# Patient Record
Sex: FEMALE | Race: BLACK OR AFRICAN AMERICAN | NOT HISPANIC OR LATINO | URBAN - METROPOLITAN AREA
[De-identification: names, ages, dates, MRNs, and addresses within clinical notes are randomized per-mention and may not be internally consistent; named-entity substitution may affect disease eponyms.]

---

## 2016-03-05 RX ORDER — CHOLECALCIFEROL (VITAMIN D3) 125 MCG
4000 CAPSULE ORAL
Qty: 30 | Refills: 0 | COMMUNITY
Start: 2016-03-05 | End: 2016-04-04

## 2017-03-03 ENCOUNTER — INPATIENT (INPATIENT)
Facility: HOSPITAL | Age: 42
LOS: 2 days | Discharge: HOME | DRG: 60 | End: 2017-03-06
Attending: INTERNAL MEDICINE | Admitting: INTERNAL MEDICINE
Payer: MEDICAID

## 2017-03-03 VITALS
TEMPERATURE: 99 F | HEART RATE: 103 BPM | RESPIRATION RATE: 20 BRPM | DIASTOLIC BLOOD PRESSURE: 88 MMHG | OXYGEN SATURATION: 98 % | SYSTOLIC BLOOD PRESSURE: 146 MMHG

## 2017-03-03 DIAGNOSIS — N63 UNSPECIFIED LUMP IN BREAST: Chronic | ICD-10-CM

## 2017-03-03 DIAGNOSIS — Z98.89 OTHER SPECIFIED POSTPROCEDURAL STATES: Chronic | ICD-10-CM

## 2017-03-03 DIAGNOSIS — Z41.8 ENCOUNTER FOR OTHER PROCEDURES FOR PURPOSES OTHER THAN REMEDYING HEALTH STATE: ICD-10-CM

## 2017-03-03 DIAGNOSIS — R53.1 WEAKNESS: ICD-10-CM

## 2017-03-03 DIAGNOSIS — Z90.710 ACQUIRED ABSENCE OF BOTH CERVIX AND UTERUS: Chronic | ICD-10-CM

## 2017-03-03 DIAGNOSIS — G35 MULTIPLE SCLEROSIS: ICD-10-CM

## 2017-03-03 DIAGNOSIS — O34.21 MATERNAL CARE FOR SCAR FROM PREVIOUS CESAREAN DELIVERY: Chronic | ICD-10-CM

## 2017-03-03 DIAGNOSIS — E55.9 VITAMIN D DEFICIENCY, UNSPECIFIED: ICD-10-CM

## 2017-03-03 DIAGNOSIS — O34.529: Chronic | ICD-10-CM

## 2017-03-03 LAB
ALBUMIN SERPL ELPH-MCNC: 5.1 G/DL — HIGH (ref 3.3–5)
ALP SERPL-CCNC: 55 U/L — SIGNIFICANT CHANGE UP (ref 40–120)
ALT FLD-CCNC: 12 U/L RC — SIGNIFICANT CHANGE UP (ref 10–45)
ANION GAP SERPL CALC-SCNC: 14 MMOL/L — SIGNIFICANT CHANGE UP (ref 5–17)
APPEARANCE UR: CLEAR — SIGNIFICANT CHANGE UP
AST SERPL-CCNC: 18 U/L — SIGNIFICANT CHANGE UP (ref 10–40)
BASOPHILS # BLD AUTO: 0.1 K/UL — SIGNIFICANT CHANGE UP (ref 0–0.2)
BASOPHILS NFR BLD AUTO: 1 % — SIGNIFICANT CHANGE UP (ref 0–2)
BILIRUB SERPL-MCNC: 1.5 MG/DL — HIGH (ref 0.2–1.2)
BILIRUB UR-MCNC: NEGATIVE — SIGNIFICANT CHANGE UP
BUN SERPL-MCNC: 17 MG/DL — SIGNIFICANT CHANGE UP (ref 7–23)
CALCIUM SERPL-MCNC: 10 MG/DL — SIGNIFICANT CHANGE UP (ref 8.4–10.5)
CHLORIDE SERPL-SCNC: 103 MMOL/L — SIGNIFICANT CHANGE UP (ref 96–108)
CO2 SERPL-SCNC: 25 MMOL/L — SIGNIFICANT CHANGE UP (ref 22–31)
COLOR SPEC: YELLOW — SIGNIFICANT CHANGE UP
CREAT SERPL-MCNC: 0.91 MG/DL — SIGNIFICANT CHANGE UP (ref 0.5–1.3)
DIFF PNL FLD: NEGATIVE — SIGNIFICANT CHANGE UP
EOSINOPHIL # BLD AUTO: 0.1 K/UL — SIGNIFICANT CHANGE UP (ref 0–0.5)
EOSINOPHIL NFR BLD AUTO: 1.2 % — SIGNIFICANT CHANGE UP (ref 0–6)
EPI CELLS # UR: SIGNIFICANT CHANGE UP /HPF
GLUCOSE SERPL-MCNC: 85 MG/DL — SIGNIFICANT CHANGE UP (ref 70–99)
GLUCOSE UR QL: NEGATIVE — SIGNIFICANT CHANGE UP
HCT VFR BLD CALC: 39.5 % — SIGNIFICANT CHANGE UP (ref 34.5–45)
HGB BLD-MCNC: 13.4 G/DL — SIGNIFICANT CHANGE UP (ref 11.5–15.5)
KETONES UR-MCNC: ABNORMAL
LEUKOCYTE ESTERASE UR-ACNC: NEGATIVE — SIGNIFICANT CHANGE UP
LYMPHOCYTES # BLD AUTO: 2.7 K/UL — SIGNIFICANT CHANGE UP (ref 1–3.3)
LYMPHOCYTES # BLD AUTO: 36.9 % — SIGNIFICANT CHANGE UP (ref 13–44)
MCHC RBC-ENTMCNC: 30.1 PG — SIGNIFICANT CHANGE UP (ref 27–34)
MCHC RBC-ENTMCNC: 33.8 GM/DL — SIGNIFICANT CHANGE UP (ref 32–36)
MCV RBC AUTO: 89.2 FL — SIGNIFICANT CHANGE UP (ref 80–100)
MONOCYTES # BLD AUTO: 0.4 K/UL — SIGNIFICANT CHANGE UP (ref 0–0.9)
MONOCYTES NFR BLD AUTO: 4.9 % — SIGNIFICANT CHANGE UP (ref 2–14)
NEUTROPHILS # BLD AUTO: 4.1 K/UL — SIGNIFICANT CHANGE UP (ref 1.8–7.4)
NEUTROPHILS NFR BLD AUTO: 55.9 % — SIGNIFICANT CHANGE UP (ref 43–77)
NITRITE UR-MCNC: NEGATIVE — SIGNIFICANT CHANGE UP
PH UR: 6 — SIGNIFICANT CHANGE UP (ref 4.8–8)
PLATELET # BLD AUTO: 263 K/UL — SIGNIFICANT CHANGE UP (ref 150–400)
POTASSIUM SERPL-MCNC: 3.9 MMOL/L — SIGNIFICANT CHANGE UP (ref 3.5–5.3)
POTASSIUM SERPL-SCNC: 3.9 MMOL/L — SIGNIFICANT CHANGE UP (ref 3.5–5.3)
PROT SERPL-MCNC: 8 G/DL — SIGNIFICANT CHANGE UP (ref 6–8.3)
PROT UR-MCNC: 30 MG/DL
RBC # BLD: 4.43 M/UL — SIGNIFICANT CHANGE UP (ref 3.8–5.2)
RBC # FLD: 12.3 % — SIGNIFICANT CHANGE UP (ref 10.3–14.5)
RBC CASTS # UR COMP ASSIST: SIGNIFICANT CHANGE UP /HPF (ref 0–2)
SODIUM SERPL-SCNC: 142 MMOL/L — SIGNIFICANT CHANGE UP (ref 135–145)
SP GR SPEC: >1.03 — HIGH (ref 1.01–1.02)
UROBILINOGEN FLD QL: NEGATIVE — SIGNIFICANT CHANGE UP
WBC # BLD: 7.3 K/UL — SIGNIFICANT CHANGE UP (ref 3.8–10.5)
WBC # FLD AUTO: 7.3 K/UL — SIGNIFICANT CHANGE UP (ref 3.8–10.5)
WBC UR QL: SIGNIFICANT CHANGE UP /HPF (ref 0–5)

## 2017-03-03 PROCEDURE — 99223 1ST HOSP IP/OBS HIGH 75: CPT

## 2017-03-03 PROCEDURE — 99285 EMERGENCY DEPT VISIT HI MDM: CPT

## 2017-03-03 RX ORDER — ENOXAPARIN SODIUM 100 MG/ML
40 INJECTION SUBCUTANEOUS EVERY 24 HOURS
Qty: 0 | Refills: 0 | Status: DISCONTINUED | OUTPATIENT
Start: 2017-03-03 | End: 2017-03-06

## 2017-03-03 RX ORDER — IBUPROFEN 200 MG
800 TABLET ORAL EVERY 8 HOURS
Qty: 0 | Refills: 0 | Status: DISCONTINUED | OUTPATIENT
Start: 2017-03-03 | End: 2017-03-06

## 2017-03-03 RX ORDER — CHOLECALCIFEROL (VITAMIN D3) 125 MCG
4000 CAPSULE ORAL DAILY
Qty: 0 | Refills: 0 | Status: DISCONTINUED | OUTPATIENT
Start: 2017-03-03 | End: 2017-03-06

## 2017-03-03 RX ORDER — ACETAMINOPHEN 500 MG
1000 TABLET ORAL ONCE
Qty: 0 | Refills: 0 | Status: COMPLETED | OUTPATIENT
Start: 2017-03-03 | End: 2017-03-03

## 2017-03-03 RX ORDER — IBUPROFEN 200 MG
400 TABLET ORAL EVERY 6 HOURS
Qty: 0 | Refills: 0 | Status: DISCONTINUED | OUTPATIENT
Start: 2017-03-03 | End: 2017-03-06

## 2017-03-03 RX ORDER — SODIUM CHLORIDE 9 MG/ML
1000 INJECTION INTRAMUSCULAR; INTRAVENOUS; SUBCUTANEOUS ONCE
Qty: 0 | Refills: 0 | Status: COMPLETED | OUTPATIENT
Start: 2017-03-03 | End: 2017-03-03

## 2017-03-03 RX ADMIN — Medication 4000 UNIT(S): at 17:24

## 2017-03-03 RX ADMIN — Medication 400 MILLIGRAM(S): at 14:23

## 2017-03-03 RX ADMIN — Medication 800 MILLIGRAM(S): at 22:30

## 2017-03-03 RX ADMIN — Medication 800 MILLIGRAM(S): at 21:00

## 2017-03-03 RX ADMIN — SODIUM CHLORIDE 1000 MILLILITER(S): 9 INJECTION INTRAMUSCULAR; INTRAVENOUS; SUBCUTANEOUS at 14:23

## 2017-03-03 NOTE — ED ADULT NURSE NOTE - OBJECTIVE STATEMENT
pt with hx of MS, has had bilateral lower extremity weakness since friday, c/o increasing weakness from front of legs to thighs, worsening, never falling but having to catch herself from falling. pt aao x 3, no resp distress, skin warm, dry. denies recent illness, no chest pain, no cough, afebrile, no n/v/d, no urinary symptoms, states having normal bm's. puentes x 4 with equal strength, ambulatory without difficulty

## 2017-03-03 NOTE — ED PROVIDER NOTE - PMH
Multiple sclerosis  relapsing/remitting - recent exacerbation June 2015 required hospitalization & steroid treament  Ovarian cyst    Patient is [place]    Uterine leiomyoma

## 2017-03-03 NOTE — ED ADULT NURSE NOTE - PMH
Multiple sclerosis  relapsing/remitting - recent exacerbation June 2015 required hospitalization & steroid treament  Ovarian cyst    Patient is [employer]    Uterine leiomyoma

## 2017-03-03 NOTE — ED PROVIDER NOTE - OBJECTIVE STATEMENT
This is a 39 y/o female with PMH of MS (dx. 1997 -  last flare in March 2016), s/p resection of L breast cyst (benign), uterine polyps s/p hysterectomy, and ovarian cyst s/p cystectomy who presents to the ED with c/o worsening right leg numbness and left leg weakness x 7 days.  Patient was last admitted for MS flare with similar presentation in March 2016 and treated with IV steroids.  Patient denies headache, dizziness, CP, palpitations, cough, SOB, N/V/D, abdominal pain, dysuria, fever, chills, and diaphoresis.  5/19:  Admitted with MS flare.  Rec'd 1 gram IV in ED.  Further steroid dosing per neurology based on MRI results per neurology.   MRI head  Patient has known demyelinating disease. There are foci of   enhancement appreciated in the periventricular and subcortical white   matter that were not seen on the prior as described above which are   consistent with acute plaques in patient that appears to be demonstrating   new symptoms when compared with the prior 3/20/16 suggesting an acute   flare of disease      5/20 nights: refusing fingersticks. f/u BMP  5/21 	pt refusing am labs  5/22/16 dc planning  s/p 2 days of b12 inj.  out pt egd was recommended for weakly positive deaminated gliadin peptide to r/o celiac dz 40yoF pmh MS (dx. 1997 -  last flare 4 months ago), s/p resection of L breast cyst (benign), uterine polyps s/p hysterectomy, and ovarian cyst s/p cystectomy comes to Ed c/o of 1 week hx of persistent b/l LE weakness.  Pt states sensation is similar to prior MS flares.  In Ed pt has no objective motor or sensory deficits reflex wnl.  Pt reports no f/c, no n/v/d, no cp/sob/cough/palp, no dysuria, no abd pain   pt does not take steroids on regular basis, but notes last flare tx with iv steroids.   neur- deangles

## 2017-03-03 NOTE — ED ADULT NURSE NOTE - PSH
Delivery with history of     H/O hysterectomy for benign disease    H/O ovarian cystectomy    Left breast mass  surgical excision, benign  Ovarian dermoid cyst complicating pregnancy, antepartum, unspecified trimester    Status post hysteroscopy  uterine polypectomy   Status post tonsillectomy  age 19

## 2017-03-03 NOTE — H&P ADULT. - NEUROLOGICAL DETAILS
alert and oriented x 3/responds to pain/responds to verbal commands alert and oriented x 3/responds to pain/sensation intact/responds to verbal commands

## 2017-03-03 NOTE — ED PROVIDER NOTE - MEDICAL DECISION MAKING DETAILS
Lew: 41F pmh of MS (dx. 1997 -  last flare in May 2016), s/p resection of L breast cyst (benign), uterine polyps s/p hysterectomy, and ovarian cyst s/p cystectomy who presents to the ED with c/o lower ext pain and weakness x 1 week.  -presumed MS flare: will perform labs, give fluids and get neuro consult; TBA for full work-up which will include MRI

## 2017-03-03 NOTE — H&P ADULT. - PROBLEM SELECTOR PLAN 1
Patient with Presumed MS Flare.  Will get repeat imaging: MRI head, Cervical, Thoracic Spine with and without Contrast.  I discussed the case with Dr. Mccoy --> We will hold off on steroids until after the imaging results have been completes  For her pain she requested Motrin

## 2017-03-03 NOTE — H&P ADULT. - ASSESSMENT
41F pmh of MS (dx. 1997 -  last flare in May 2016), s/p resection of L breast cyst (benign), uterine polyps s/p hysterectomy, and ovarian cyst s/p cystectomy who presents to the ED with c/o lower ext pain and weakness a/w presumed MS flare

## 2017-03-03 NOTE — H&P ADULT. - HISTORY OF PRESENT ILLNESS
41F pmh of MS (dx. 1997 -  last flare in May 2016), s/p resection of L breast cyst (benign), uterine polyps s/p hysterectomy, and ovarian cyst s/p cystectomy who presents to the ED with c/o lower ext pain and weakness.  She states that her symptoms started last Friday when she was having 3-4/10 pain 41F pmh of MS (dx. 1997 -  last flare in May 2016), s/p resection of L breast cyst (benign), uterine polyps s/p hysterectomy, and ovarian cyst s/p cystectomy who presents to the ED with c/o lower ext pain and weakness.  She states that her symptoms started last Friday when she was having 3-4/10 throbbing pain and they have progressively worsened to about an 8/10. She also reports that she almost fell this morning while trying to walk at home. 10 point ROS was completed and were all negative except for as above.

## 2017-03-03 NOTE — H&P ADULT. - RS GEN PE MLT RESP DETAILS PC
no rales/clear to auscultation bilaterally/no rhonchi/no wheezes/airway patent/respirations non-labored/breath sounds equal/good air movement

## 2017-03-04 LAB
ALBUMIN SERPL ELPH-MCNC: 4.5 G/DL — SIGNIFICANT CHANGE UP (ref 3.3–5)
ALP SERPL-CCNC: 50 U/L — SIGNIFICANT CHANGE UP (ref 40–120)
ALT FLD-CCNC: 10 U/L RC — SIGNIFICANT CHANGE UP (ref 10–45)
AST SERPL-CCNC: 18 U/L — SIGNIFICANT CHANGE UP (ref 10–40)
BILIRUB DIRECT SERPL-MCNC: 0.3 MG/DL — HIGH (ref 0–0.2)
BILIRUB INDIRECT FLD-MCNC: 1.4 MG/DL — HIGH (ref 0.2–1)
BILIRUB SERPL-MCNC: 1.7 MG/DL — HIGH (ref 0.2–1.2)
PROT SERPL-MCNC: 7.1 G/DL — SIGNIFICANT CHANGE UP (ref 6–8.3)

## 2017-03-04 PROCEDURE — 72156 MRI NECK SPINE W/O & W/DYE: CPT | Mod: 26

## 2017-03-04 PROCEDURE — 70553 MRI BRAIN STEM W/O & W/DYE: CPT | Mod: 26

## 2017-03-04 PROCEDURE — 72157 MRI CHEST SPINE W/O & W/DYE: CPT | Mod: 26

## 2017-03-04 RX ORDER — BACLOFEN 100 %
5 POWDER (GRAM) MISCELLANEOUS
Qty: 0 | Refills: 0 | Status: DISCONTINUED | OUTPATIENT
Start: 2017-03-04 | End: 2017-03-06

## 2017-03-04 RX ORDER — DIAZEPAM 5 MG
5 TABLET ORAL ONCE
Qty: 0 | Refills: 0 | Status: DISCONTINUED | OUTPATIENT
Start: 2017-03-04 | End: 2017-03-04

## 2017-03-04 RX ORDER — BACLOFEN 100 %
5 POWDER (GRAM) MISCELLANEOUS
Qty: 0 | Refills: 0 | Status: DISCONTINUED | OUTPATIENT
Start: 2017-03-04 | End: 2017-03-04

## 2017-03-04 RX ADMIN — Medication 800 MILLIGRAM(S): at 05:42

## 2017-03-04 RX ADMIN — Medication 4000 UNIT(S): at 11:13

## 2017-03-04 RX ADMIN — Medication 5 MILLIGRAM(S): at 13:42

## 2017-03-04 RX ADMIN — Medication 800 MILLIGRAM(S): at 06:35

## 2017-03-04 RX ADMIN — ENOXAPARIN SODIUM 40 MILLIGRAM(S): 100 INJECTION SUBCUTANEOUS at 10:34

## 2017-03-04 RX ADMIN — Medication 5 MILLIGRAM(S): at 13:58

## 2017-03-05 LAB
ALBUMIN SERPL ELPH-MCNC: 4.3 G/DL — SIGNIFICANT CHANGE UP (ref 3.3–5)
ALP SERPL-CCNC: 49 U/L — SIGNIFICANT CHANGE UP (ref 40–120)
ALT FLD-CCNC: 7 U/L — LOW (ref 10–45)
ANION GAP SERPL CALC-SCNC: 16 MMOL/L — SIGNIFICANT CHANGE UP (ref 5–17)
AST SERPL-CCNC: 16 U/L — SIGNIFICANT CHANGE UP (ref 10–40)
BILIRUB SERPL-MCNC: 0.7 MG/DL — SIGNIFICANT CHANGE UP (ref 0.2–1.2)
BUN SERPL-MCNC: 21 MG/DL — SIGNIFICANT CHANGE UP (ref 7–23)
CALCIUM SERPL-MCNC: 9.5 MG/DL — SIGNIFICANT CHANGE UP (ref 8.4–10.5)
CHLORIDE SERPL-SCNC: 106 MMOL/L — SIGNIFICANT CHANGE UP (ref 96–108)
CO2 SERPL-SCNC: 22 MMOL/L — SIGNIFICANT CHANGE UP (ref 22–31)
CREAT SERPL-MCNC: 1.04 MG/DL — SIGNIFICANT CHANGE UP (ref 0.5–1.3)
GLUCOSE SERPL-MCNC: 91 MG/DL — SIGNIFICANT CHANGE UP (ref 70–99)
POTASSIUM SERPL-MCNC: 4.2 MMOL/L — SIGNIFICANT CHANGE UP (ref 3.5–5.3)
POTASSIUM SERPL-SCNC: 4.2 MMOL/L — SIGNIFICANT CHANGE UP (ref 3.5–5.3)
PROT SERPL-MCNC: 7.2 G/DL — SIGNIFICANT CHANGE UP (ref 6–8.3)
SODIUM SERPL-SCNC: 144 MMOL/L — SIGNIFICANT CHANGE UP (ref 135–145)

## 2017-03-05 RX ADMIN — Medication 5 MILLIGRAM(S): at 20:47

## 2017-03-05 RX ADMIN — Medication 800 MILLIGRAM(S): at 21:47

## 2017-03-05 RX ADMIN — Medication 800 MILLIGRAM(S): at 20:47

## 2017-03-05 RX ADMIN — Medication 4000 UNIT(S): at 14:15

## 2017-03-05 RX ADMIN — Medication 29 MILLIGRAM(S): at 14:15

## 2017-03-05 RX ADMIN — Medication 5 MILLIGRAM(S): at 07:03

## 2017-03-05 NOTE — PHYSICAL THERAPY INITIAL EVALUATION ADULT - ACTIVE RANGE OF MOTION EXAMINATION, REHAB EVAL
bilateral lower extremity Active ROM was WNL (within normal limits)/sharda. upper extremity Active ROM was WNL (within normal limits)

## 2017-03-05 NOTE — PHYSICAL THERAPY INITIAL EVALUATION ADULT - ADDITIONAL COMMENTS
as per pt, resides in a PH with spouse and son, 3 DEVONTE, 7 Stairs down to Bsmt, PTA pt I with amb and all ADLs

## 2017-03-06 ENCOUNTER — TRANSCRIPTION ENCOUNTER (OUTPATIENT)
Age: 42
End: 2017-03-06

## 2017-03-06 VITALS
RESPIRATION RATE: 18 BRPM | SYSTOLIC BLOOD PRESSURE: 111 MMHG | DIASTOLIC BLOOD PRESSURE: 72 MMHG | OXYGEN SATURATION: 96 % | TEMPERATURE: 99 F | HEART RATE: 77 BPM

## 2017-03-06 PROCEDURE — 76705 ECHO EXAM OF ABDOMEN: CPT | Mod: 26,RT

## 2017-03-06 RX ORDER — BACLOFEN 100 %
0.5 POWDER (GRAM) MISCELLANEOUS
Qty: 30 | Refills: 0 | OUTPATIENT
Start: 2017-03-06 | End: 2017-03-21

## 2017-03-06 RX ORDER — BACLOFEN 100 %
0.5 POWDER (GRAM) MISCELLANEOUS
Qty: 15 | Refills: 0 | OUTPATIENT
Start: 2017-03-06 | End: 2017-03-21

## 2017-03-06 RX ADMIN — Medication 5 MILLIGRAM(S): at 08:07

## 2017-03-06 RX ADMIN — Medication 5 MILLIGRAM(S): at 17:30

## 2017-03-06 RX ADMIN — Medication 4000 UNIT(S): at 14:20

## 2017-03-06 NOTE — DISCHARGE NOTE ADULT - PLAN OF CARE
improved strength and able to perform activities of daily living stable , status post intravenous steroids,  follow up with Dr Orona improved

## 2017-03-06 NOTE — DISCHARGE NOTE ADULT - CARE PLAN
Principal Discharge DX:	Multiple sclerosis  Goal:	improved strength and able to perform activities of daily living  Instructions for follow-up, activity and diet:	stable , status post intravenous steroids,  follow up with Dr Orona  Secondary Diagnosis:	Weakness  Instructions for follow-up, activity and diet:	improved

## 2017-03-06 NOTE — DISCHARGE NOTE ADULT - HOSPITAL COURSE
attending to write 41F pmh of MS (dx. 1997 -  last flare in May 2016), s/p resection of L breast cyst (benign), uterine polyps s/p hysterectomy, and ovarian cyst s/p cystectomy who presents to the ED with c/o lower ext pain and weakness.  She states that her symptoms started last Friday when she was having 3-4/10 throbbing pain and they have progressively worsened to about an 8/10. She also reports that she almost fell this morning while trying to walk at home.     Admitting Dx: Presumed MS Flare-up 41F pmh of MS (dx. 1997 -  last flare in May 2016), s/p resection of L breast cyst (benign), uterine polyps s/p hysterectomy, and ovarian cyst s/p cystectomy who presents to the ED with c/o lower ext pain and weakness.  She states that her symptoms started last Friday when she was having 3-4/10 throbbing pain and they have progressively worsened to about an 8/10. She also reports that she almost fell this morning while trying to walk at home.     Admitting Dx: Presumed MS Flare-up  3/3/16 Pt. admitted for bilateral leg weakness, h/o MS, admitted for MS falre up , Dr Orona neurology will see patient and prefers to hold off on steroids and get full imaging tests first. PT eval and motrin prn for pain.  3/4: call neurology, Dr. Orona() after MRI is done, may need to start steroids depends upon the result.   mr reviewed w/ neuro   neuro f/u

## 2017-03-06 NOTE — DISCHARGE NOTE ADULT - CARE PROVIDER_API CALL
Joie Mccoy), Neurology  [address]  [place] [address]  Phone: [phone]  Fax: [phone] Joie Mccoy), Neurology  [address]  [place] [address]  Phone: [phone]  Fax: [phone]    Kayode Wing (), Gastroenterology; Internal Medicine  [address]  [place] [address]  Phone: [phone]  Fax: [phone]

## 2017-03-06 NOTE — DISCHARGE NOTE ADULT - CARE PROVIDERS DIRECT ADDRESSES
,DirectAddress_Unknown,DirectAddress_Unknown ,DirectAddress_Unknown,[email or web address],DirectAddress_Unknown

## 2017-03-06 NOTE — DISCHARGE NOTE ADULT - PATIENT PORTAL LINK FT
“You can access the Follow[facility] Patient Portal, offered by [facility], by registering with the following website: [email or web address]”

## 2017-03-06 NOTE — DISCHARGE NOTE ADULT - INSTRUCTIONS
You had an elevated bilrubin and indirect bilirubin , please follow up with PMD and or Dr ayala from gastroenterologist

## 2017-03-06 NOTE — DISCHARGE NOTE ADULT - OTHER SIGNIFICANT FINDINGS
EXAM:  US ABDOMEN RT UPR QUADRANT                            PROCEDURE DATE:  03/06/2017            INTERPRETATION:  CLINICAL INFORMATION: History of MS, elevated LFTs    COMPARISON: None available.    TECHNIQUE: Sonography of the right upper quadrant.     FINDINGS:    Liver: Within normal limits.  Bile ducts: Normal caliber. Common hepatic duct measures 3 mm.   Gallbladder: Within normal limits.      Pancreas: Visualized portions are within normal limits.  Right kidney: 9 cm. No hydronephrosis.  Ascites: None.  IVC: Visualized portions are within normal limits.    IMPRESSION:     Normal right upper quadrant abdominal ultrasound.                AUTUMN ALFONSO M.D., RADIOLOGY FELLOW  This document has been electronically signed.  ALCIRA WASHINGTON M.D., ATTENDING RADIOLOGIST  This document has been electronically signed. Mar  6 2017  4:44PM

## 2017-03-06 NOTE — DISCHARGE NOTE ADULT - MEDICATION SUMMARY - MEDICATIONS TO TAKE
I will START or STAY ON the medications listed below when I get home from the hospital:    Motrin 800 mg oral tablet  -- 1 tab(s) by mouth 3 times a day, As Needed  -- Indication: For pain    Tysabri 300 mg/15 mL intravenous concentrate  --  intravenous   -- Indication: For Multiple sclerosis    baclofen 10 mg oral tablet  -- 0.5 tab(s) by mouth 4 times a day  -- It is very important that you take or use this exactly as directed.  Do not skip doses or discontinue unless directed by your doctor.  May cause drowsiness.  Alcohol may intensify this effect.  Use care when operating dangerous machinery.  Obtain medical advice before taking any non-prescription drugs as some may affect the action of this medication.    -- Indication: For spasm    cholecalciferol oral tablet  -- 4000 unit(s) by mouth once a day  -- Indication: For supplement I will START or STAY ON the medications listed below when I get home from the hospital:    Motrin 800 mg oral tablet  -- 1 tab(s) by mouth 3 times a day, As Needed  -- Indication: For pain    Tysabri 300 mg/15 mL intravenous concentrate  --  intravenous   -- Indication: For Multiple sclerosis    baclofen 10 mg oral tablet  -- 0.5 tab(s) by mouth 2 times a day  -- It is very important that you take or use this exactly as directed.  Do not skip doses or discontinue unless directed by your doctor.  May cause drowsiness.  Alcohol may intensify this effect.  Use care when operating dangerous machinery.  Obtain medical advice before taking any non-prescription drugs as some may affect the action of this medication.    -- Indication: For spasm    cholecalciferol oral tablet  -- 4000 unit(s) by mouth once a day  -- Indication: For supplement

## 2017-03-07 LAB — ACRM MODULATING ANTIBODY: 0 NMOL/L — SIGNIFICANT CHANGE UP

## 2017-03-09 LAB — ACHR MOD AB SER-ACNC: 5 — SIGNIFICANT CHANGE UP

## 2017-03-10 LAB — ACHR BLOCK AB SER-ACNC: <15 — SIGNIFICANT CHANGE UP

## 2017-06-29 ENCOUNTER — INPATIENT (INPATIENT)
Facility: HOSPITAL | Age: 42
LOS: 2 days | Discharge: HOME | DRG: 60 | End: 2017-07-02
Attending: PSYCHIATRY & NEUROLOGY | Admitting: PSYCHIATRY & NEUROLOGY
Payer: MEDICAID

## 2017-06-29 VITALS
DIASTOLIC BLOOD PRESSURE: 77 MMHG | TEMPERATURE: 99 F | RESPIRATION RATE: 16 BRPM | SYSTOLIC BLOOD PRESSURE: 136 MMHG | OXYGEN SATURATION: 98 % | HEART RATE: 91 BPM

## 2017-06-29 DIAGNOSIS — O34.529: Chronic | ICD-10-CM

## 2017-06-29 DIAGNOSIS — R20.0 ANESTHESIA OF SKIN: ICD-10-CM

## 2017-06-29 DIAGNOSIS — N63 UNSPECIFIED LUMP IN BREAST: Chronic | ICD-10-CM

## 2017-06-29 DIAGNOSIS — Z90.710 ACQUIRED ABSENCE OF BOTH CERVIX AND UTERUS: Chronic | ICD-10-CM

## 2017-06-29 DIAGNOSIS — Z98.89 OTHER SPECIFIED POSTPROCEDURAL STATES: Chronic | ICD-10-CM

## 2017-06-29 DIAGNOSIS — O34.21 MATERNAL CARE FOR SCAR FROM PREVIOUS CESAREAN DELIVERY: Chronic | ICD-10-CM

## 2017-06-29 LAB
ALBUMIN SERPL ELPH-MCNC: 4.8 G/DL — SIGNIFICANT CHANGE UP (ref 3.3–5)
ALP SERPL-CCNC: 50 U/L — SIGNIFICANT CHANGE UP (ref 40–120)
ALT FLD-CCNC: 12 U/L RC — SIGNIFICANT CHANGE UP (ref 10–45)
ANION GAP SERPL CALC-SCNC: 13 MMOL/L — SIGNIFICANT CHANGE UP (ref 5–17)
APPEARANCE UR: CLEAR — SIGNIFICANT CHANGE UP
AST SERPL-CCNC: 17 U/L — SIGNIFICANT CHANGE UP (ref 10–40)
BACTERIA # UR AUTO: ABNORMAL /HPF
BASOPHILS # BLD AUTO: 0.1 K/UL — SIGNIFICANT CHANGE UP (ref 0–0.2)
BASOPHILS NFR BLD AUTO: 1.8 % — SIGNIFICANT CHANGE UP (ref 0–2)
BILIRUB SERPL-MCNC: 1.6 MG/DL — HIGH (ref 0.2–1.2)
BILIRUB UR-MCNC: NEGATIVE — SIGNIFICANT CHANGE UP
BUN SERPL-MCNC: 15 MG/DL — SIGNIFICANT CHANGE UP (ref 7–23)
CALCIUM SERPL-MCNC: 9.7 MG/DL — SIGNIFICANT CHANGE UP (ref 8.4–10.5)
CHLORIDE SERPL-SCNC: 103 MMOL/L — SIGNIFICANT CHANGE UP (ref 96–108)
CO2 SERPL-SCNC: 25 MMOL/L — SIGNIFICANT CHANGE UP (ref 22–31)
COLOR SPEC: SIGNIFICANT CHANGE UP
CREAT SERPL-MCNC: 0.96 MG/DL — SIGNIFICANT CHANGE UP (ref 0.5–1.3)
DIFF PNL FLD: NEGATIVE — SIGNIFICANT CHANGE UP
EOSINOPHIL # BLD AUTO: 0.1 K/UL — SIGNIFICANT CHANGE UP (ref 0–0.5)
EOSINOPHIL NFR BLD AUTO: 1.8 % — SIGNIFICANT CHANGE UP (ref 0–6)
EPI CELLS # UR: SIGNIFICANT CHANGE UP /HPF
GAS PNL BLDV: SIGNIFICANT CHANGE UP
GLUCOSE SERPL-MCNC: 86 MG/DL — SIGNIFICANT CHANGE UP (ref 70–99)
GLUCOSE UR QL: NEGATIVE — SIGNIFICANT CHANGE UP
HCT VFR BLD CALC: 40.9 % — SIGNIFICANT CHANGE UP (ref 34.5–45)
HGB BLD-MCNC: 14.3 G/DL — SIGNIFICANT CHANGE UP (ref 11.5–15.5)
KETONES UR-MCNC: NEGATIVE — SIGNIFICANT CHANGE UP
LEUKOCYTE ESTERASE UR-ACNC: NEGATIVE — SIGNIFICANT CHANGE UP
LYMPHOCYTES # BLD AUTO: 2.3 K/UL — SIGNIFICANT CHANGE UP (ref 1–3.3)
LYMPHOCYTES # BLD AUTO: 37.2 % — SIGNIFICANT CHANGE UP (ref 13–44)
MCHC RBC-ENTMCNC: 31.7 PG — SIGNIFICANT CHANGE UP (ref 27–34)
MCHC RBC-ENTMCNC: 35 GM/DL — SIGNIFICANT CHANGE UP (ref 32–36)
MCV RBC AUTO: 90.6 FL — SIGNIFICANT CHANGE UP (ref 80–100)
MONOCYTES # BLD AUTO: 0.4 K/UL — SIGNIFICANT CHANGE UP (ref 0–0.9)
MONOCYTES NFR BLD AUTO: 5.7 % — SIGNIFICANT CHANGE UP (ref 2–14)
NEUTROPHILS # BLD AUTO: 3.3 K/UL — SIGNIFICANT CHANGE UP (ref 1.8–7.4)
NEUTROPHILS NFR BLD AUTO: 53.5 % — SIGNIFICANT CHANGE UP (ref 43–77)
NITRITE UR-MCNC: NEGATIVE — SIGNIFICANT CHANGE UP
PH UR: 6.5 — SIGNIFICANT CHANGE UP (ref 5–8)
PLATELET # BLD AUTO: 260 K/UL — SIGNIFICANT CHANGE UP (ref 150–400)
POTASSIUM SERPL-MCNC: 3.3 MMOL/L — LOW (ref 3.5–5.3)
POTASSIUM SERPL-SCNC: 3.3 MMOL/L — LOW (ref 3.5–5.3)
PROT SERPL-MCNC: 7.7 G/DL — SIGNIFICANT CHANGE UP (ref 6–8.3)
PROT UR-MCNC: NEGATIVE — SIGNIFICANT CHANGE UP
RBC # BLD: 4.51 M/UL — SIGNIFICANT CHANGE UP (ref 3.8–5.2)
RBC # FLD: 11.6 % — SIGNIFICANT CHANGE UP (ref 10.3–14.5)
SODIUM SERPL-SCNC: 141 MMOL/L — SIGNIFICANT CHANGE UP (ref 135–145)
SP GR SPEC: 1.02 — SIGNIFICANT CHANGE UP (ref 1.01–1.02)
UROBILINOGEN FLD QL: NEGATIVE — SIGNIFICANT CHANGE UP
WBC # BLD: 6.2 K/UL — SIGNIFICANT CHANGE UP (ref 3.8–10.5)
WBC # FLD AUTO: 6.2 K/UL — SIGNIFICANT CHANGE UP (ref 3.8–10.5)
WBC UR QL: SIGNIFICANT CHANGE UP /HPF (ref 0–5)

## 2017-06-29 PROCEDURE — 99285 EMERGENCY DEPT VISIT HI MDM: CPT

## 2017-06-29 RX ORDER — SODIUM CHLORIDE 9 MG/ML
1000 INJECTION, SOLUTION INTRAVENOUS
Qty: 0 | Refills: 0 | Status: DISCONTINUED | OUTPATIENT
Start: 2017-06-29 | End: 2017-07-02

## 2017-06-29 RX ORDER — ACETAMINOPHEN 500 MG
650 TABLET ORAL EVERY 6 HOURS
Qty: 0 | Refills: 0 | Status: DISCONTINUED | OUTPATIENT
Start: 2017-06-29 | End: 2017-07-02

## 2017-06-29 RX ORDER — DEXTROSE 50 % IN WATER 50 %
12.5 SYRINGE (ML) INTRAVENOUS ONCE
Qty: 0 | Refills: 0 | Status: DISCONTINUED | OUTPATIENT
Start: 2017-06-29 | End: 2017-07-02

## 2017-06-29 RX ORDER — GLUCAGON INJECTION, SOLUTION 0.5 MG/.1ML
1 INJECTION, SOLUTION SUBCUTANEOUS ONCE
Qty: 0 | Refills: 0 | Status: DISCONTINUED | OUTPATIENT
Start: 2017-06-29 | End: 2017-07-02

## 2017-06-29 RX ORDER — NATALIZUMAB 300 MG/15ML
0 INJECTION INTRAVENOUS
Qty: 0 | Refills: 0 | COMMUNITY

## 2017-06-29 RX ORDER — DEXTROSE 50 % IN WATER 50 %
1 SYRINGE (ML) INTRAVENOUS ONCE
Qty: 0 | Refills: 0 | Status: DISCONTINUED | OUTPATIENT
Start: 2017-06-29 | End: 2017-07-02

## 2017-06-29 RX ORDER — INSULIN LISPRO 100/ML
VIAL (ML) SUBCUTANEOUS
Qty: 0 | Refills: 0 | Status: DISCONTINUED | OUTPATIENT
Start: 2017-06-29 | End: 2017-07-02

## 2017-06-29 RX ORDER — DEXTROSE 50 % IN WATER 50 %
25 SYRINGE (ML) INTRAVENOUS ONCE
Qty: 0 | Refills: 0 | Status: DISCONTINUED | OUTPATIENT
Start: 2017-06-29 | End: 2017-07-02

## 2017-06-29 RX ORDER — POTASSIUM CHLORIDE 20 MEQ
40 PACKET (EA) ORAL ONCE
Qty: 0 | Refills: 0 | Status: COMPLETED | OUTPATIENT
Start: 2017-06-29 | End: 2017-06-29

## 2017-06-29 RX ORDER — PANTOPRAZOLE SODIUM 20 MG/1
40 TABLET, DELAYED RELEASE ORAL
Qty: 0 | Refills: 0 | Status: DISCONTINUED | OUTPATIENT
Start: 2017-06-29 | End: 2017-07-02

## 2017-06-29 RX ORDER — ENOXAPARIN SODIUM 100 MG/ML
40 INJECTION SUBCUTANEOUS EVERY 24 HOURS
Qty: 0 | Refills: 0 | Status: DISCONTINUED | OUTPATIENT
Start: 2017-06-29 | End: 2017-07-02

## 2017-06-29 RX ADMIN — Medication 58 MILLIGRAM(S): at 16:39

## 2017-06-29 RX ADMIN — Medication 650 MILLIGRAM(S): at 21:18

## 2017-06-29 RX ADMIN — ENOXAPARIN SODIUM 40 MILLIGRAM(S): 100 INJECTION SUBCUTANEOUS at 17:55

## 2017-06-29 RX ADMIN — Medication 40 MILLIEQUIVALENT(S): at 17:55

## 2017-06-29 RX ADMIN — Medication 650 MILLIGRAM(S): at 19:06

## 2017-06-29 NOTE — H&P ADULT - PMH
Multiple sclerosis  relapsing/remitting - recent exacerbation June 2015 required hospitalization & steroid treament  Ovarian cyst    Patient is Jew    Uterine leiomyoma

## 2017-06-29 NOTE — ED PROVIDER NOTE - MEDICAL DECISION MAKING DETAILS
ATTD: left side weakness, concern for neuro flair, will check labs, neuro eval. likely admit for further care.

## 2017-06-29 NOTE — H&P ADULT - NSHPPHYSICALEXAM_GEN_ALL_CORE
General: Appears well, laying down in bed, conversant, well-nourished, well-hydrated, NAD  MSE: A&Ox3, can name, attention/calculation intact, can follow simple and complex commands.  CN: VFF, PERRL, EOMI, facial sensation intact, jaw clench intact, no facial droop, hearing intact to finger rub, palate elevation symmetric and uvula midline, shoulder shrug intact, SCM intact 5/5, tongue midline to protrusion  Motor: strength 5/5 in b/l UE and RLE, LLE 4/5   Sensory: decreased to LT/PP below T4-5 on L, decreased to LT/PP in LLE   Reflexes: no abdominal reflex, 3+ b/l biceps/brachioradialis, patellars, ankles  Coordination: FTN intact, HTS intact

## 2017-06-29 NOTE — ED PROVIDER NOTE - ATTENDING CONTRIBUTION TO CARE
41 y/of with pmhx MS, diagnosed in 1998, with occasional flairs, last was in March presents for weakness / numbness on left leg / left side. SHe was seen by her Neurologist and sent to ER for further work up and care. Pt was seen by Dr Asher earlier this week who wants patient to be admitted to Neuro service for further work up and MRIs.  Pt has had similar episode in the past.  Pt describes heaviness of LLE and pain.  No tingling.  PT has "shock" sensation when she flex her head forward.  denies chest pain, shortness of breath, abd pain, n/v/d, fevers/chills, no visual distrubances.  Neurologist: Dr. Asher  Gen. no acute distress, Non toxic   HEENT: EOMI, mmm,   Lungs: CTAB/L no C/ W /R   CVS: S1S2   Abd; Soft non tender, non distended   Ext: no edema   MSK: ll 4/5, sarah 5/5, ru 5/5 rl5/5.   sens dimi from T8 down on left side.   Neuro AAOx3 non focal clear speech

## 2017-06-29 NOTE — H&P ADULT - HISTORY OF PRESENT ILLNESS
40yo   woman [facility] MS presents with L-sided numbness/weakness x 7 days. At baseline, pt is able to ambulate independently and is independent on ADLs. On Friday, she began to notice numbness on her L lower back that progressively spread down to her LLE over the course of the week. On Sunday, she noticed associated weakness of her LLE that also worsened over the course of the week. No antecedent fevers, chills, URI symptoms, n/v/d. Pt's presentation is similar to some of her prior MS exacerbations; last exacerbation was in 03/17. She presented to her outpatient neurologist, who referred her to the ED for further management and treatment.     Of note, at baseline, pt endorses b/l LE throbbing pain x 1 month. Pt was previously well-controlled on Cymbalta, but she did not tolerate the side effects of the medication.     Her previous exacerbations have included optic neuritis, weakness, and numbness in different parts of the body depending on the episodes. Pt is currently not on disease-modifying agents for her MS due to medication side effects.

## 2017-06-29 NOTE — H&P ADULT - ASSESSMENT
40yo   woman PMH MS presents with L-sided numbness/weakness x 7 days. Exam notable for decreased sensation and weakness on L below T4-5. Pt's presentation consistent with MS exacerbation.   [] MRI Brain/Cervical/Thoracic spine w/wo   [] Solumedrol 1g x 3 days  [] GEMINI, GI PPx  [] DVT PPx with Lovenox   [] PT/OT eval

## 2017-06-29 NOTE — ED PROVIDER NOTE - PMH
Multiple sclerosis  relapsing/remitting - recent exacerbation June 2015 required hospitalization & steroid treament  Ovarian cyst    Patient is Mu-ism    Uterine leiomyoma

## 2017-06-29 NOTE — ED PROVIDER NOTE - OBJECTIVE STATEMENT
41 F pmh MS p/w numbness on left side of body from mid back down to toes for 1 week.  Pt was seen by Dr Asher earlier this week who wants patient to be admitted to Neuro service for further work up and MRIs.  Pt has had similar episode in the past.  Pt describes heaviness of LLE and pain.  No tingling.  PT has "shock" sensation when she flex her head forward.  denies chest pain, shortness of breath, abd pain, n/v/d, fevers/chills, no visual distrubances.  Neurologist: Dr. Asher 41 F pmh MS p/w numbness on left side of body from mid back down to toes for 1 week.  Pt was seen by Dr Asher earlier this week who wants patient to be admitted to Neuro service for further work up and MRIs.  Pt has had similar episode in the past.  Pt describes heaviness of LLE and pain.  No tingling.  PT has "shock" sensation when she flex her head forward.  denies chest pain, shortness of breath, abd pain, n/v/d, fevers/chills, no visual disturbances.  Pt is able to ambulate.    Neurologist: Dr. Asher  - Dolly Thurman,

## 2017-06-29 NOTE — ED PROVIDER NOTE - PHYSICAL EXAMINATION
Gen: NAD, AOx3  Head: NCAT  HEENT: PERRL, oral mucosa moist, normal conjunctiva  Lung: CTAB, no respiratory distress  CV: rrr, no murmurs, Normal perfusion  Abd: soft, NTND, numbness of left abdominal skin above and below umbilicus  MSK: No edema, no visible deformities  Neuro: CN 2-12 intact, 5/5 strength upper extremities, 5/5 strength RLE, 4/5 strength LLE with decreased sensation.    Skin: No rash   Psych: normal affect   - Dolly Thurman, DO

## 2017-06-29 NOTE — ED ADULT NURSE NOTE - OBJECTIVE STATEMENT
41 year old ambulatory female presents to ED c/o L lower extremity numbness x 1week progressively getting worse.  Patient states she noted Lower back numbness that spread to L lower extremity, saw her neurologist yesterday who sent her TBA to neurology.  Patient denies fevers, cough, difficulty swallowing, tingling.  Lung sounds clear. ABd nondistended nontender. Skin warm dry intact. family at bedside. in no acute distress. neuro intact. see neuro flow sheet. Moves all 4 extremities. PERRL. In no acute distress.

## 2017-06-29 NOTE — H&P ADULT - ATTENDING COMMENTS
Patient seen and examined  Patient is well known to our practice. With MS and acute left LE weakness and pain.  Resident note reviewed  Plan of Care Reviewed     I agree with plan  Would give patient Valium 5mg prior to MRI

## 2017-06-29 NOTE — H&P ADULT - NSHPLABSRESULTS_GEN_ALL_CORE
CBC Full  -  ( 29 Jun 2017 12:33 )  WBC Count : 6.2 K/uL  Hemoglobin : 14.3 g/dL  Hematocrit : 40.9 %  Platelet Count - Automated : 260 K/uL  Mean Cell Volume : 90.6 fl  Mean Cell Hemoglobin : 31.7 pg  Mean Cell Hemoglobin Concentration : 35.0 gm/dL  Auto Neutrophil # : 3.3 K/uL  Auto Lymphocyte # : 2.3 K/uL  Auto Monocyte # : 0.4 K/uL  Auto Eosinophil # : 0.1 K/uL  Auto Basophil # : 0.1 K/uL  Auto Neutrophil % : 53.5 %  Auto Lymphocyte % : 37.2 %  Auto Monocyte % : 5.7 %  Auto Eosinophil % : 1.8 %  Auto Basophil % : 1.8 %    06-29    141  |  103  |  15  ----------------------------<  86  3.3<L>   |  25  |  0.96    Ca    9.7      29 Jun 2017 12:33    TPro  7.7  /  Alb  4.8  /  TBili  1.6<H>  /  DBili  x   /  AST  17  /  ALT  12  /  AlkPhos  50  06-29

## 2017-06-30 DIAGNOSIS — G35 MULTIPLE SCLEROSIS: ICD-10-CM

## 2017-06-30 LAB — HBA1C BLD-MCNC: 6 % — HIGH (ref 4–5.6)

## 2017-06-30 RX ORDER — IBUPROFEN 200 MG
400 TABLET ORAL ONCE
Qty: 0 | Refills: 0 | Status: COMPLETED | OUTPATIENT
Start: 2017-06-30 | End: 2017-06-30

## 2017-06-30 RX ORDER — DIAZEPAM 5 MG
5 TABLET ORAL ONCE
Qty: 0 | Refills: 0 | Status: DISCONTINUED | OUTPATIENT
Start: 2017-06-30 | End: 2017-06-30

## 2017-06-30 RX ADMIN — PANTOPRAZOLE SODIUM 40 MILLIGRAM(S): 20 TABLET, DELAYED RELEASE ORAL at 06:23

## 2017-06-30 RX ADMIN — Medication 58 MILLIGRAM(S): at 06:20

## 2017-06-30 RX ADMIN — Medication 400 MILLIGRAM(S): at 17:23

## 2017-06-30 RX ADMIN — Medication 5 MILLIGRAM(S): at 22:18

## 2017-06-30 RX ADMIN — ENOXAPARIN SODIUM 40 MILLIGRAM(S): 100 INJECTION SUBCUTANEOUS at 17:33

## 2017-06-30 NOTE — OCCUPATIONAL THERAPY INITIAL EVALUATION ADULT - ADDITIONAL COMMENTS
Pt's presentation is similar to some of her prior MS exacerbations; last exacerbation was in 03/17. She presented to her outpatient neurologist, who referred her to the ED for further management and treatment. At baseline, pt endorses b/l LE throbbing pain x 1 month. Pt was previously well-controlled on Cymbalta, but she did not tolerate the side effects of the medication. Pt previous exacerbations have included optic neuritis, weakness, and numbness in different parts of the body depending on the episodes. Pt is currently not on disease-modifying agents for her MS due to medication side effects.

## 2017-06-30 NOTE — CONSULT NOTE ADULT - ASSESSMENT
42yo   woman PMH MS presents with L-sided numbness/weakness x 7 days. Exam notable for decreased sensation and weakness on L below T4-5. Pt's presentation consistent with MS exacerbation.  cw steroids as per Neuro  imaging as per neuro

## 2017-06-30 NOTE — OCCUPATIONAL THERAPY INITIAL EVALUATION ADULT - PERTINENT HX OF CURRENT PROBLEM, REHAB EVAL
42yo  F PMH MS presents with L-sided numbness/weakness x 7 days. At baseline, pt is able to ambulate independently and is independent on ADLs. On Friday, she began to notice numbness on her L lower back that progressively spread down to her LLE over the course of the week. On Sunday, she noticed associated weakness of her LLE that also worsened over the course of the week. (cont below)

## 2017-06-30 NOTE — PROGRESS NOTE ADULT - PROBLEM SELECTOR PLAN 1
MRI Brain/Cervical/Thoracic spine w/wo   Solumedrol 1g x 3 days  GEMINI, GI PPx  DVT PPx with Lovenox   PT/OT eval

## 2017-06-30 NOTE — OCCUPATIONAL THERAPY INITIAL EVALUATION ADULT - ANTICIPATED DISCHARGE DISPOSITION, OT EVAL
Home with supervision/assist from spouse and son for functional activities as needed especially during exacerbation of MS symptoms

## 2017-06-30 NOTE — PROVIDER CONTACT NOTE (OTHER) - ASSESSMENT
VSS. Pt in no current distress. Pt educated on importance of FS while on IVPB solumedrol. Pt states to understand but she states that she is knowledgeable about her medication and she manages her diet while on steriods.

## 2017-06-30 NOTE — OCCUPATIONAL THERAPY INITIAL EVALUATION ADULT - LIVES WITH, PROFILE
children/Pt lives with  and 16yo son in 2nd floor walk up apt, 3 steps to enter without rail, +12 steps to ascend tp apt with L side up railing. bedroom has high bed, bathtub shower without grab bars/spouse

## 2017-06-30 NOTE — OCCUPATIONAL THERAPY INITIAL EVALUATION ADULT - DIAGNOSIS, OT EVAL
decreased Activities of Daily Living, decreased strength and sensation LLE/L abdomen and L back beginning from braline, decreased functional endurance

## 2017-06-30 NOTE — OCCUPATIONAL THERAPY INITIAL EVALUATION ADULT - NS ASR BATHING EQUIP NEEDS
pt has handheld shower, recommend long sponge and shower chair/shower chair/hand held shower/long hand sponge

## 2017-06-30 NOTE — PROVIDER CONTACT NOTE (OTHER) - ACTION/TREATMENT ORDERED:
Provider made aware. no further actions at this time. pt safety maintained will continue to monitor.

## 2017-07-01 DIAGNOSIS — E87.6 HYPOKALEMIA: ICD-10-CM

## 2017-07-01 LAB
ALBUMIN SERPL ELPH-MCNC: 4.5 G/DL — SIGNIFICANT CHANGE UP (ref 3.3–5)
ALP SERPL-CCNC: 61 U/L — SIGNIFICANT CHANGE UP (ref 40–120)
ALT FLD-CCNC: 11 U/L — SIGNIFICANT CHANGE UP (ref 10–45)
ANION GAP SERPL CALC-SCNC: 18 MMOL/L — HIGH (ref 5–17)
AST SERPL-CCNC: 18 U/L — SIGNIFICANT CHANGE UP (ref 10–40)
BASOPHILS # BLD AUTO: 0.01 K/UL — SIGNIFICANT CHANGE UP (ref 0–0.2)
BASOPHILS NFR BLD AUTO: 0 % — SIGNIFICANT CHANGE UP (ref 0–2)
BILIRUB SERPL-MCNC: 2 MG/DL — HIGH (ref 0.2–1.2)
BUN SERPL-MCNC: 18 MG/DL — SIGNIFICANT CHANGE UP (ref 7–23)
CALCIUM SERPL-MCNC: 10.2 MG/DL — SIGNIFICANT CHANGE UP (ref 8.4–10.5)
CHLORIDE SERPL-SCNC: 100 MMOL/L — SIGNIFICANT CHANGE UP (ref 96–108)
CO2 SERPL-SCNC: 23 MMOL/L — SIGNIFICANT CHANGE UP (ref 22–31)
CREAT SERPL-MCNC: 0.93 MG/DL — SIGNIFICANT CHANGE UP (ref 0.5–1.3)
EOSINOPHIL # BLD AUTO: 0 K/UL — SIGNIFICANT CHANGE UP (ref 0–0.5)
EOSINOPHIL NFR BLD AUTO: 0 % — SIGNIFICANT CHANGE UP (ref 0–6)
GLUCOSE SERPL-MCNC: 132 MG/DL — HIGH (ref 70–99)
HCT VFR BLD CALC: 40.5 % — SIGNIFICANT CHANGE UP (ref 34.5–45)
HGB BLD-MCNC: 13.5 G/DL — SIGNIFICANT CHANGE UP (ref 11.5–15.5)
IMM GRANULOCYTES NFR BLD AUTO: 0.3 % — SIGNIFICANT CHANGE UP (ref 0–1.5)
LYMPHOCYTES # BLD AUTO: 1.25 K/UL — SIGNIFICANT CHANGE UP (ref 1–3.3)
LYMPHOCYTES # BLD AUTO: 4.7 % — LOW (ref 13–44)
MCHC RBC-ENTMCNC: 29.4 PG — SIGNIFICANT CHANGE UP (ref 27–34)
MCHC RBC-ENTMCNC: 33.3 GM/DL — SIGNIFICANT CHANGE UP (ref 32–36)
MCV RBC AUTO: 88.2 FL — SIGNIFICANT CHANGE UP (ref 80–100)
MONOCYTES # BLD AUTO: 0.43 K/UL — SIGNIFICANT CHANGE UP (ref 0–0.9)
MONOCYTES NFR BLD AUTO: 1.6 % — LOW (ref 2–14)
NEUTROPHILS # BLD AUTO: 24.63 K/UL — HIGH (ref 1.8–7.4)
NEUTROPHILS NFR BLD AUTO: 93.4 % — HIGH (ref 43–77)
PLATELET # BLD AUTO: 277 K/UL — SIGNIFICANT CHANGE UP (ref 150–400)
POTASSIUM SERPL-MCNC: 3.9 MMOL/L — SIGNIFICANT CHANGE UP (ref 3.5–5.3)
POTASSIUM SERPL-SCNC: 3.9 MMOL/L — SIGNIFICANT CHANGE UP (ref 3.5–5.3)
PROT SERPL-MCNC: 7.6 G/DL — SIGNIFICANT CHANGE UP (ref 6–8.3)
RBC # BLD: 4.59 M/UL — SIGNIFICANT CHANGE UP (ref 3.8–5.2)
RBC # FLD: 12.9 % — SIGNIFICANT CHANGE UP (ref 10.3–14.5)
SODIUM SERPL-SCNC: 141 MMOL/L — SIGNIFICANT CHANGE UP (ref 135–145)
WBC # BLD: 26.41 K/UL — HIGH (ref 3.8–10.5)
WBC # FLD AUTO: 26.41 K/UL — HIGH (ref 3.8–10.5)

## 2017-07-01 PROCEDURE — 72157 MRI CHEST SPINE W/O & W/DYE: CPT | Mod: 26

## 2017-07-01 PROCEDURE — 70553 MRI BRAIN STEM W/O & W/DYE: CPT | Mod: 26

## 2017-07-01 PROCEDURE — 72156 MRI NECK SPINE W/O & W/DYE: CPT | Mod: 26

## 2017-07-01 RX ORDER — MAGNESIUM HYDROXIDE 400 MG/1
30 TABLET, CHEWABLE ORAL ONCE
Qty: 0 | Refills: 0 | Status: COMPLETED | OUTPATIENT
Start: 2017-07-01 | End: 2017-07-01

## 2017-07-01 RX ORDER — MAGNESIUM HYDROXIDE 400 MG/1
30 TABLET, CHEWABLE ORAL DAILY
Qty: 0 | Refills: 0 | Status: DISCONTINUED | OUTPATIENT
Start: 2017-07-01 | End: 2017-07-02

## 2017-07-01 RX ADMIN — ENOXAPARIN SODIUM 40 MILLIGRAM(S): 100 INJECTION SUBCUTANEOUS at 17:53

## 2017-07-01 RX ADMIN — MAGNESIUM HYDROXIDE 30 MILLILITER(S): 400 TABLET, CHEWABLE ORAL at 13:41

## 2017-07-01 RX ADMIN — PANTOPRAZOLE SODIUM 40 MILLIGRAM(S): 20 TABLET, DELAYED RELEASE ORAL at 05:41

## 2017-07-01 RX ADMIN — Medication 58 MILLIGRAM(S): at 05:41

## 2017-07-01 NOTE — PHYSICAL THERAPY INITIAL EVALUATION ADULT - ADDITIONAL COMMENTS
Pt lives with  and 14yo son in 2nd floor walk up apt, 3 steps to enter without rail, +12 steps to ascend tp apt with L side up railing. bedroom has high bed, bathtub shower without grab bars. Pt was indpenedent in all functional mobility no AD and all ALD's prior

## 2017-07-01 NOTE — PHYSICAL THERAPY INITIAL EVALUATION ADULT - PRECAUTIONS/LIMITATIONS, REHAB EVAL
Pt's presentation is similar to some of her prior MS exacerbations; last exacerbation was in 03/17. She presented to her outpatient neurologist, who referred her to the ED for further management and treatment. At baseline, pt endorses b/l LE throbbing pain x 1 month. previously well-controlled on Cymbalta, but she did not tolerate the side effects of the medication. Pt previous exacerbations have included optic neuritis, weakness, and numbness in different parts of the body depending on the episodes. Pt is currently not on disease-modifying agents for her MS due to medication side effects./fall precautions

## 2017-07-01 NOTE — PHYSICAL THERAPY INITIAL EVALUATION ADULT - PERTINENT HX OF CURRENT PROBLEM, REHAB EVAL
40yo  F PMH MS presents with L-sided numbness/weakness x 7 days. At baseline, pt is able to ambulate independently and is independent on ADLs. On Friday, she began to notice numbness on her L lower back that progressively spread down to her LLE over the course of the week. On Sunday, she noticed associated weakness of her LLE that also worsened over the course of the week.

## 2017-07-01 NOTE — PHYSICAL THERAPY INITIAL EVALUATION ADULT - ASR WT BEARING STATUS EVAL
no weight-bearing restrictions/MRI Brain: There is an acute enhancing plaque in the left centrum semiovale and a tiny focus of enhancement in the right centrum semiovale ovale new since 3/4/2017. Multiple other nonenhancing plaques of demyelination are stable.

## 2017-07-01 NOTE — PHYSICAL THERAPY INITIAL EVALUATION ADULT - CRITERIA FOR SKILLED THERAPEUTIC INTERVENTIONS
impairments found/predicted duration of therapy intervention/rehab potential/anticipated discharge recommendation/therapy frequency/anticipated equipment needs at discharge/functional limitations in following categories/risk reduction/prevention

## 2017-07-01 NOTE — PHYSICAL THERAPY INITIAL EVALUATION ADULT - GENERAL OBSERVATIONS, REHAB EVAL
Pt encountered independently walking in room, + IV lock , AO x 4, moving all 4 extremities ad javed.

## 2017-07-01 NOTE — PROGRESS NOTE ADULT - PROBLEM SELECTOR PLAN 1
1) Follow up off MRI  2) Recheck K  3) GI and DVT proph  4) IF MRI negative may d/c and if acute ledion 5 days 1) Follow up official MRI report  2) Recheck K  3) GI and DVT proph  4) IF MRI negative may d/c and if acute lesion 5 days 1) Follow up official MRI report--acute brain lesion enhancing--c/w solumedrol IV  2) Recheck K  3) GI and DVT proph  4) IF MRI negative may d/c and if acute lesion 5 days

## 2017-07-02 VITALS
DIASTOLIC BLOOD PRESSURE: 80 MMHG | TEMPERATURE: 98 F | RESPIRATION RATE: 18 BRPM | HEART RATE: 76 BPM | SYSTOLIC BLOOD PRESSURE: 127 MMHG | OXYGEN SATURATION: 99 %

## 2017-07-02 RX ORDER — PANTOPRAZOLE SODIUM 20 MG/1
1 TABLET, DELAYED RELEASE ORAL
Qty: 30 | Refills: 0 | OUTPATIENT
Start: 2017-07-02 | End: 2017-08-01

## 2017-07-02 RX ORDER — PANTOPRAZOLE SODIUM 20 MG/1
1 TABLET, DELAYED RELEASE ORAL
Qty: 0 | Refills: 0 | COMMUNITY
Start: 2017-07-02

## 2017-07-02 RX ADMIN — PANTOPRAZOLE SODIUM 40 MILLIGRAM(S): 20 TABLET, DELAYED RELEASE ORAL at 08:38

## 2017-07-02 RX ADMIN — Medication 58 MILLIGRAM(S): at 05:13

## 2017-07-02 NOTE — PROGRESS NOTE ADULT - ASSESSMENT
42yo   woman PMH MS presents with L-sided numbness/weakness x 7 days. Exam notable for decreased sensation and weakness on L below T4-5. Pt's presentation consistent with MS exacerbation.
42yo   woman PMH MS presents with L-sided numbness/weakness x 7 days. Exam notable for decreased sensation and weakness on L below T4-5. Pt's presentation consistent with MS exacerbation.  cw steroids as per Neuro  imaging as per neuro  will montior
42 YO MS with acute exacerbation with new lesions seen on MRI
41 year old woman with MS admitted with symptoms of MS flare

## 2017-07-02 NOTE — PROGRESS NOTE ADULT - PROBLEM SELECTOR PLAN 1
Patient to receive last dose of IV Solumedrol this AM then D/C with steroid taper.  Patient understands she is only receiving 4 doses  Patient will follow with Dr. Norma Arthur (?Tecfidera)  PMD Follow for elevated A1C  D/C with follow up in our office

## 2017-07-02 NOTE — DISCHARGE NOTE ADULT - NS AS DC STROKE ED MATERIALS
Call 911 for Stroke/Stroke Education Booklet/Risk Factors for Stroke/Stroke Warning Signs and Symptoms/Need for Followup After Discharge/Prescribed Medications

## 2017-07-02 NOTE — DISCHARGE NOTE ADULT - HOSPITAL COURSE
40yo   woman [facility] MS presents with L-sided numbness/weakness x 7 days. At baseline, pt is able to ambulate independently and is independent on ADLs. On Friday, she began to notice numbness on her L lower back that progressively spread down to her LLE over the course of the week. On Sunday, she noticed associated weakness of her LLE that also worsened over the course of the week.   Patient was admitted to the stroke unit for observation and work up. Work up included MRI Brain demonstrating here is an acute enhancing plaque in the left centrum  semiovale and a tiny focus of enhancement in the right centrum semiovale   ovale new since 3/4/2017. Multiple other nonenhancing plaques of   demyelination are stable. Patient was started on solumedrol 1000 mg X 4 days and stable for discharge. Etiology of patient's symptoms of numbness were secondary to MS exacerbation. . Patient underwent blood pressure management, preventative care, and physical therapy recommeding outoatient PT course of stay. Patient is now stable for follow up as outpatient in 2-4 weeks with Dr Asher. Patient is to consider DMARD therapy as outpatient.  Patient is to continue medications as directed for MS exacerbation. .

## 2017-07-02 NOTE — PROGRESS NOTE ADULT - SUBJECTIVE AND OBJECTIVE BOX
Patient is a 41y old  Female who presents with a chief complaint of leg pain and weakness (29 Jun 2017 17:51)      INTERVAL HPI/OVERNIGHT EVENTS:  T(C): 37.1 (07-01-17 @ 13:22), Max: 37.1 (07-01-17 @ 09:30)  HR: 71 (07-01-17 @ 13:22) (60 - 84)  BP: 113/71 (07-01-17 @ 13:22) (97/58 - 128/77)  RR: 18 (07-01-17 @ 13:22) (18 - 18)  SpO2: 100% (07-01-17 @ 13:22) (99% - 100%)  Wt(kg): --  I&O's Summary    30 Jun 2017 07:01  -  01 Jul 2017 07:00  --------------------------------------------------------  IN: 2060 mL / OUT: 0 mL / NET: 2060 mL    01 Jul 2017 07:01  -  01 Jul 2017 14:17  --------------------------------------------------------  IN: 680 mL / OUT: 0 mL / NET: 680 mL        LABS:                        13.5   26.41 )-----------( 277      ( 01 Jul 2017 10:25 )             40.5     07-01    141  |  100  |  18  ----------------------------<  132<H>  3.9   |  23  |  0.93    Ca    10.2      01 Jul 2017 11:03    TPro  7.6  /  Alb  4.5  /  TBili  2.0<H>  /  DBili  x   /  AST  18  /  ALT  11  /  AlkPhos  61  07-01        CAPILLARY BLOOD GLUCOSE                MEDICATIONS  (STANDING):  enoxaparin Injectable 40 milliGRAM(s) SubCutaneous every 24 hours  methylPREDNISolone sodium succinate IVPB 1000 milliGRAM(s) IV Intermittent daily  pantoprazole    Tablet 40 milliGRAM(s) Oral before breakfast  insulin lispro (HumaLOG) corrective regimen sliding scale   SubCutaneous three times a day before meals  dextrose 5%. 1000 milliLiter(s) (50 mL/Hr) IV Continuous <Continuous>  dextrose 50% Injectable 12.5 Gram(s) IV Push once  dextrose 50% Injectable 25 Gram(s) IV Push once  dextrose 50% Injectable 25 Gram(s) IV Push once    MEDICATIONS  (PRN):  acetaminophen   Tablet. 650 milliGRAM(s) Oral every 6 hours PRN Mild Pain (1 - 3)  dextrose Gel 1 Dose(s) Oral once PRN Blood Glucose LESS THAN 70 milliGRAM(s)/deciliter  glucagon  Injectable 1 milliGRAM(s) IntraMuscular once PRN Glucose LESS THAN 70 milligrams/deciliter  magnesium hydroxide Suspension 30 milliLiter(s) Oral daily PRN Constipation          PHYSICAL EXAM:  GENERAL: NAD, well-groomed, well-developed  HEAD:  Atraumatic, Normocephalic  CHEST/LUNG: Clear to percussion bilaterally; No rales, rhonchi, wheezing, or rubs  HEART: Regular rate and rhythm; No murmurs, rubs, or gallops  ABDOMEN: Soft, Nontender, Nondistended; Bowel sounds present  EXTREMITIES:  2+ Peripheral Pulses, No clubbing, cyanosis, or edema  LYMPH: No lymphadenopathy noted  SKIN: No rashes or lesions    Care Discussed with Consultants/Other Providers [ ] YES  [ ] NO
Patient seen and examined  Chart Reviewed  No acute events  No new complaints  Up and walking
Patient seen and examined  Chart reviewed  No new complaints.   Vital Signs Last 24 Hrs  T(C): 36.7 (02 Jul 2017 04:51), Max: 37.1 (01 Jul 2017 09:30)  T(F): 98.1 (02 Jul 2017 04:51), Max: 98.8 (01 Jul 2017 09:30)  HR: 79 (02 Jul 2017 04:51) (66 - 79)  BP: 107/68 (02 Jul 2017 04:51) (107/68 - 130/88)  BP(mean): --  RR: 18 (02 Jul 2017 04:51) (18 - 18)  SpO2: 99% (02 Jul 2017 04:51) (99% - 100%)
Neurology Progress note    Interval History: Patient seen and examined bedside, no new events overnight    Vitals  T 98.2 HR 98 /79  RR 18 O2 98%    Physical Examination  AOX3, follows commands, normal speech, normal cognition, able to name/repeat/comprehend  EOMi, PERRLA, no nystagmus, no APD,  VVF, V1-V3 normal, no facial assymetry, normal shrug, tongue midline  Motor: strength 5/5 in b/l UE and RLE, LLE 4/5   Sensory: decreased to LT/PP below T4-5 on L, decreased to LT/PP in LLE   Reflexes: no abdominal reflex, 3+ b/l biceps/brachioradialis, patellars, ankles  Coordination: FTN intact, HTS intact    Labs/Imaging  Reviwed on surise

## 2017-07-02 NOTE — DISCHARGE NOTE ADULT - CARE PLAN
Principal Discharge DX:	Multiple sclerosis  Goal:	prevention  Instructions for follow-up, activity and diet:	C/w prednisone taper  F/u Dr. Asher as o/p

## 2017-07-02 NOTE — DISCHARGE NOTE ADULT - CARE PROVIDER_API CALL
Romel Asher (DO), Neurology  [address] [place] [address]  Phone: [phone]  Fax: [phone]

## 2017-07-02 NOTE — DISCHARGE NOTE ADULT - MEDICATION SUMMARY - MEDICATIONS TO TAKE
I will START or STAY ON the medications listed below when I get home from the hospital:    predniSONE 10 mg oral tablet  -- 1 tab(s) by mouth once a day  60mgX2 days, 50 mg X 2 days, 40 mg X2 days, 30 mg X 2 days, 20 mg X2 days, 10 mg X 2 days MDD:12tabs  -- It is very important that you take or use this exactly as directed.  Do not skip doses or discontinue unless directed by your doctor.  Obtain medical advice before taking any non-prescription drugs as some may affect the action of this medication.  Take with food or milk.    -- Indication: For Multiple sclerosis    Motrin 800 mg oral tablet  -- 1 tab(s) by mouth 3 times a day, As Needed  -- Indication: For Numbness    pantoprazole 40 mg oral delayed release tablet  -- 1 tab(s) by mouth once a day (before a meal)  -- Indication: For GI PPX    cholecalciferol oral tablet  -- 4000 unit(s) by mouth once a day  -- Indication: For Vitamins

## 2017-07-23 PROCEDURE — 84295 ASSAY OF SERUM SODIUM: CPT

## 2017-07-23 PROCEDURE — 85027 COMPLETE CBC AUTOMATED: CPT

## 2017-07-23 PROCEDURE — 97165 OT EVAL LOW COMPLEX 30 MIN: CPT

## 2017-07-23 PROCEDURE — 85014 HEMATOCRIT: CPT

## 2017-07-23 PROCEDURE — 86042 ACETYLCHOLN RCPTR BLCKG ANTB: CPT

## 2017-07-23 PROCEDURE — 70553 MRI BRAIN STEM W/O & W/DYE: CPT

## 2017-07-23 PROCEDURE — 96374 THER/PROPH/DIAG INJ IV PUSH: CPT

## 2017-07-23 PROCEDURE — 81001 URINALYSIS AUTO W/SCOPE: CPT

## 2017-07-23 PROCEDURE — 99285 EMERGENCY DEPT VISIT HI MDM: CPT

## 2017-07-23 PROCEDURE — 80053 COMPREHEN METABOLIC PANEL: CPT

## 2017-07-23 PROCEDURE — 84132 ASSAY OF SERUM POTASSIUM: CPT

## 2017-07-23 PROCEDURE — 83605 ASSAY OF LACTIC ACID: CPT

## 2017-07-23 PROCEDURE — 83036 HEMOGLOBIN GLYCOSYLATED A1C: CPT

## 2017-07-23 PROCEDURE — A9585: CPT

## 2017-07-23 PROCEDURE — 72156 MRI NECK SPINE W/O & W/DYE: CPT

## 2017-07-23 PROCEDURE — 82435 ASSAY OF BLOOD CHLORIDE: CPT

## 2017-07-23 PROCEDURE — 86043 ACETYLCHOLN RCPTR MODLG ANTB: CPT

## 2017-07-23 PROCEDURE — 72157 MRI CHEST SPINE W/O & W/DYE: CPT

## 2017-07-23 PROCEDURE — 97161 PT EVAL LOW COMPLEX 20 MIN: CPT

## 2017-07-23 PROCEDURE — 99285 EMERGENCY DEPT VISIT HI MDM: CPT | Mod: 25

## 2017-07-23 PROCEDURE — 76705 ECHO EXAM OF ABDOMEN: CPT

## 2017-07-23 PROCEDURE — 82947 ASSAY GLUCOSE BLOOD QUANT: CPT

## 2017-07-23 PROCEDURE — 86041 ACETYLCHOLN RCPTR BNDNG ANTB: CPT

## 2017-07-23 PROCEDURE — 80076 HEPATIC FUNCTION PANEL: CPT

## 2017-07-23 PROCEDURE — 82803 BLOOD GASES ANY COMBINATION: CPT

## 2017-07-23 PROCEDURE — 82330 ASSAY OF CALCIUM: CPT

## 2017-07-23 PROCEDURE — G0378: CPT

## 2017-12-01 ENCOUNTER — OUTPATIENT (OUTPATIENT)
Dept: OUTPATIENT SERVICES | Facility: HOSPITAL | Age: 42
LOS: 1 days | End: 2017-12-01
Payer: MEDICAID

## 2017-12-01 DIAGNOSIS — Z98.89 OTHER SPECIFIED POSTPROCEDURAL STATES: Chronic | ICD-10-CM

## 2017-12-01 DIAGNOSIS — O34.529: Chronic | ICD-10-CM

## 2017-12-01 DIAGNOSIS — O34.21 MATERNAL CARE FOR SCAR FROM PREVIOUS CESAREAN DELIVERY: Chronic | ICD-10-CM

## 2017-12-01 DIAGNOSIS — N63 UNSPECIFIED LUMP IN BREAST: Chronic | ICD-10-CM

## 2017-12-01 DIAGNOSIS — Z90.710 ACQUIRED ABSENCE OF BOTH CERVIX AND UTERUS: Chronic | ICD-10-CM

## 2017-12-01 PROCEDURE — G9001: CPT

## 2017-12-03 ENCOUNTER — APPOINTMENT (OUTPATIENT)
Dept: MRI IMAGING | Facility: IMAGING CENTER | Age: 42
End: 2017-12-03

## 2017-12-14 ENCOUNTER — INPATIENT (INPATIENT)
Facility: HOSPITAL | Age: 42
LOS: 2 days | Discharge: HOME | DRG: 60 | End: 2017-12-17
Attending: PSYCHIATRY & NEUROLOGY | Admitting: PSYCHIATRY & NEUROLOGY
Payer: MEDICAID

## 2017-12-14 VITALS
TEMPERATURE: 99 F | OXYGEN SATURATION: 99 % | SYSTOLIC BLOOD PRESSURE: 142 MMHG | HEART RATE: 102 BPM | DIASTOLIC BLOOD PRESSURE: 94 MMHG | RESPIRATION RATE: 20 BRPM

## 2017-12-14 DIAGNOSIS — Z98.89 OTHER SPECIFIED POSTPROCEDURAL STATES: Chronic | ICD-10-CM

## 2017-12-14 DIAGNOSIS — Z90.710 ACQUIRED ABSENCE OF BOTH CERVIX AND UTERUS: Chronic | ICD-10-CM

## 2017-12-14 DIAGNOSIS — O34.529: Chronic | ICD-10-CM

## 2017-12-14 DIAGNOSIS — G35 MULTIPLE SCLEROSIS: ICD-10-CM

## 2017-12-14 DIAGNOSIS — O34.21 MATERNAL CARE FOR SCAR FROM PREVIOUS CESAREAN DELIVERY: Chronic | ICD-10-CM

## 2017-12-14 DIAGNOSIS — N63 UNSPECIFIED LUMP IN BREAST: Chronic | ICD-10-CM

## 2017-12-14 LAB
ALBUMIN SERPL ELPH-MCNC: 4.7 G/DL — SIGNIFICANT CHANGE UP (ref 3.3–5)
ALP SERPL-CCNC: 46 U/L — SIGNIFICANT CHANGE UP (ref 40–120)
ALT FLD-CCNC: 11 U/L RC — SIGNIFICANT CHANGE UP (ref 10–45)
ANION GAP SERPL CALC-SCNC: 14 MMOL/L — SIGNIFICANT CHANGE UP (ref 5–17)
AST SERPL-CCNC: 19 U/L — SIGNIFICANT CHANGE UP (ref 10–40)
BASOPHILS # BLD AUTO: 0 K/UL — SIGNIFICANT CHANGE UP (ref 0–0.2)
BASOPHILS NFR BLD AUTO: 0 % — SIGNIFICANT CHANGE UP (ref 0–2)
BILIRUB SERPL-MCNC: 1 MG/DL — SIGNIFICANT CHANGE UP (ref 0.2–1.2)
BUN SERPL-MCNC: 14 MG/DL — SIGNIFICANT CHANGE UP (ref 7–23)
CALCIUM SERPL-MCNC: 9.4 MG/DL — SIGNIFICANT CHANGE UP (ref 8.4–10.5)
CHLORIDE SERPL-SCNC: 103 MMOL/L — SIGNIFICANT CHANGE UP (ref 96–108)
CO2 SERPL-SCNC: 25 MMOL/L — SIGNIFICANT CHANGE UP (ref 22–31)
CREAT SERPL-MCNC: 0.83 MG/DL — SIGNIFICANT CHANGE UP (ref 0.5–1.3)
EOSINOPHIL # BLD AUTO: 0.1 K/UL — SIGNIFICANT CHANGE UP (ref 0–0.5)
EOSINOPHIL NFR BLD AUTO: 1 % — SIGNIFICANT CHANGE UP (ref 0–6)
GAS PNL BLDV: SIGNIFICANT CHANGE UP
GLUCOSE SERPL-MCNC: 87 MG/DL — SIGNIFICANT CHANGE UP (ref 70–99)
HCG SERPL-ACNC: <2 MIU/ML — LOW (ref 5–24)
HCT VFR BLD CALC: 40.8 % — SIGNIFICANT CHANGE UP (ref 34.5–45)
HGB BLD-MCNC: 13.5 G/DL — SIGNIFICANT CHANGE UP (ref 11.5–15.5)
LYMPHOCYTES # BLD AUTO: 2.5 K/UL — SIGNIFICANT CHANGE UP (ref 1–3.3)
LYMPHOCYTES # BLD AUTO: 32 % — SIGNIFICANT CHANGE UP (ref 13–44)
MCHC RBC-ENTMCNC: 30.1 PG — SIGNIFICANT CHANGE UP (ref 27–34)
MCHC RBC-ENTMCNC: 33 GM/DL — SIGNIFICANT CHANGE UP (ref 32–36)
MCV RBC AUTO: 91.2 FL — SIGNIFICANT CHANGE UP (ref 80–100)
MONOCYTES # BLD AUTO: 0.3 K/UL — SIGNIFICANT CHANGE UP (ref 0–0.9)
MONOCYTES NFR BLD AUTO: 4 % — SIGNIFICANT CHANGE UP (ref 2–14)
NEUTROPHILS # BLD AUTO: 4.9 K/UL — SIGNIFICANT CHANGE UP (ref 1.8–7.4)
NEUTROPHILS NFR BLD AUTO: 62 % — SIGNIFICANT CHANGE UP (ref 43–77)
PLATELET # BLD AUTO: 178 K/UL — SIGNIFICANT CHANGE UP (ref 150–400)
POTASSIUM SERPL-MCNC: 4.2 MMOL/L — SIGNIFICANT CHANGE UP (ref 3.5–5.3)
POTASSIUM SERPL-SCNC: 4.2 MMOL/L — SIGNIFICANT CHANGE UP (ref 3.5–5.3)
PROT SERPL-MCNC: 7.9 G/DL — SIGNIFICANT CHANGE UP (ref 6–8.3)
RBC # BLD: 4.47 M/UL — SIGNIFICANT CHANGE UP (ref 3.8–5.2)
RBC # FLD: 11.4 % — SIGNIFICANT CHANGE UP (ref 10.3–14.5)
SODIUM SERPL-SCNC: 142 MMOL/L — SIGNIFICANT CHANGE UP (ref 135–145)
WBC # BLD: 7.9 K/UL — SIGNIFICANT CHANGE UP (ref 3.8–10.5)
WBC # FLD AUTO: 7.9 K/UL — SIGNIFICANT CHANGE UP (ref 3.8–10.5)

## 2017-12-14 PROCEDURE — 99285 EMERGENCY DEPT VISIT HI MDM: CPT

## 2017-12-14 RX ORDER — DIMETHYL FUMARATE 240 MG/1
240 CAPSULE ORAL DAILY
Qty: 0 | Refills: 0 | Status: DISCONTINUED | OUTPATIENT
Start: 2017-12-14 | End: 2017-12-17

## 2017-12-14 RX ORDER — INFLUENZA VIRUS VACCINE 15; 15; 15; 15 UG/.5ML; UG/.5ML; UG/.5ML; UG/.5ML
0.5 SUSPENSION INTRAMUSCULAR ONCE
Qty: 0 | Refills: 0 | Status: DISCONTINUED | OUTPATIENT
Start: 2017-12-14 | End: 2017-12-17

## 2017-12-14 RX ORDER — ACETAMINOPHEN 500 MG
975 TABLET ORAL ONCE
Qty: 0 | Refills: 0 | Status: COMPLETED | OUTPATIENT
Start: 2017-12-14 | End: 2017-12-14

## 2017-12-14 RX ORDER — TRAMADOL HYDROCHLORIDE 50 MG/1
25 TABLET ORAL ONCE
Qty: 0 | Refills: 0 | Status: DISCONTINUED | OUTPATIENT
Start: 2017-12-14 | End: 2017-12-14

## 2017-12-14 RX ORDER — IBUPROFEN 200 MG
800 TABLET ORAL EVERY 8 HOURS
Qty: 0 | Refills: 0 | Status: DISCONTINUED | OUTPATIENT
Start: 2017-12-14 | End: 2017-12-17

## 2017-12-14 RX ORDER — PANTOPRAZOLE SODIUM 20 MG/1
40 TABLET, DELAYED RELEASE ORAL
Qty: 0 | Refills: 0 | Status: DISCONTINUED | OUTPATIENT
Start: 2017-12-14 | End: 2017-12-17

## 2017-12-14 RX ORDER — CHOLECALCIFEROL (VITAMIN D3) 125 MCG
4000 CAPSULE ORAL DAILY
Qty: 0 | Refills: 0 | Status: DISCONTINUED | OUTPATIENT
Start: 2017-12-14 | End: 2017-12-17

## 2017-12-14 RX ORDER — ENOXAPARIN SODIUM 100 MG/ML
40 INJECTION SUBCUTANEOUS EVERY 24 HOURS
Qty: 0 | Refills: 0 | Status: DISCONTINUED | OUTPATIENT
Start: 2017-12-14 | End: 2017-12-17

## 2017-12-14 RX ADMIN — TRAMADOL HYDROCHLORIDE 25 MILLIGRAM(S): 50 TABLET ORAL at 11:54

## 2017-12-14 RX ADMIN — Medication 4000 UNIT(S): at 17:45

## 2017-12-14 RX ADMIN — Medication 800 MILLIGRAM(S): at 22:55

## 2017-12-14 RX ADMIN — Medication 100 MILLIGRAM(S): at 17:45

## 2017-12-14 RX ADMIN — Medication 800 MILLIGRAM(S): at 23:25

## 2017-12-14 RX ADMIN — Medication 975 MILLIGRAM(S): at 11:24

## 2017-12-14 NOTE — ED PROVIDER NOTE - MEDICAL DECISION MAKING DETAILS
-initial impression: symptoms likely due to MS flare. will r/o electrolyte abnormalities. no concerns for transverse myelitis, cauda equina syndrome, cord compression, Guillan nicole.   -initial plan: labs, symptomatic treatment w/ analgesics, IVF -reassess, -neuro consulted -consider 1g solumedrol & admission if neuro agrees -initial impression: symptoms likely due to MS flare. will r/o electrolyte abnormalities. no concerns for transverse myelitis, cauda equina syndrome, cord compression, Guillan nicole.   -initial plan: labs, symptomatic treatment w/ analgesics, IVF -reassess, -neuro consulted -consider 1g solumedrol & admission if neuro agrees  Attending Jack: 41 y/o female presenting with left sided leg weakness, sensory changes and discomfort. low risk for dvt as no swelling. concern for MS flare vs transverse myelitis. will d/w neurology, consider MRI and need for steroids

## 2017-12-14 NOTE — ED ADULT NURSE NOTE - PMH
Multiple sclerosis  relapsing/remitting - recent exacerbation June 2015 required hospitalization & steroid treament  Ovarian cyst    Patient is Orthodox    Uterine leiomyoma

## 2017-12-14 NOTE — ED ADULT NURSE NOTE - CHPI ED SYMPTOMS NEG
no change in level of consciousness/no loss of consciousness/no fever/no blurred vision/no vomiting/no confusion/no nausea/no dizziness

## 2017-12-14 NOTE — ED PROVIDER NOTE - PHYSICAL EXAMINATION
Attending Santiago: Gen: NAD, heent: atrauamtic, eomi, perrla, mmm, op pink, uvula midline, neck; nttp, no nuchal rigidity, chest: nttp, no crepitus, cv: rrr, no murmurs, lungs: ctab, abd: soft, nontender, nondistended, no peritoneal signs, +BS, no guarding, ext: wwp, neg homans, skin: no rash, neuro: awake and alert, following commands, speech clear, altered sensation below the left knee. strength 4.5/5 to left lower extremity. hyper-reflexia to left patella

## 2017-12-14 NOTE — H&P ADULT - HISTORY OF PRESENT ILLNESS
Pt is a 43 yo F with a PMH of MS (diagnosed in 1997, now on Tecfidera 240 QD) who is here for a new LLE numbness, weakness and pain for the past 3 weeks. Pt endorses that 3 weeks ago she developed LLE numbness associated with increased sensation of coldness in the leg and subjective decreased sensation of cold. Sensation symptoms started in the forefoot and progressed to now involve the entire leg up to the thigh. The weakness started about 2 weeks ago and is causing gait difficulty, while the pain started a weeks ago. Pain is described as 8/10 (unrelieved by Motrin/Tramadol), sharp, shooting and includes the entire leg, nonreproducible. Of note pt does have chronic b/l LE pain, though this pain is different. Also of note she was following up with her neurologist (Dr. Asher) for difficult to describe sensations in the right thigh. Pt is a 43 yo F with a PMH of MS (diagnosed in 1997, now on Tecfidera 240 QD) who is here for a new LLE numbness, weakness and pain for the past 3 weeks. Pt endorses that 3 weeks ago she developed LLE numbness associated with increased sensation of coldness in the leg and subjective decreased sensation of cold. Sensation symptoms started in the forefoot and progressed to now involve the entire leg up to the thigh. The weakness started about 2 weeks ago and is causing gait difficulty, while the pain started a weeks ago. Pain is described as 8/10 (unrelieved by Motrin/Tramadol), sharp, shooting and includes the entire leg, nonreproducible. Of note pt does have chronic b/l LE pain, though this pain is different. Also of note she was following up with her neurologist (Dr. Asher) for difficult to describe sensations in the right thigh. Otherwise pt denies any fevers, chills, HA, changes in vision, CP, SOB, cough, nausea/vomiting, abdominal pain, changes in BMs/urination.

## 2017-12-14 NOTE — H&P ADULT - PMH
Multiple sclerosis  relapsing/remitting - recent exacerbation June 2015 required hospitalization & steroid treament  Ovarian cyst    Patient is Bapt[place]    Uterine leiomyoma

## 2017-12-14 NOTE — H&P ADULT - NSHPLABSRESULTS_GEN_ALL_CORE
Vital Signs Last 24 Hrs  T(C): 36.6 (14 Dec 2017 11:00), Max: 37.3 (14 Dec 2017 10:16)  T(F): 97.9 (14 Dec 2017 11:00), Max: 99.2 (14 Dec 2017 10:16)  HR: 83 (14 Dec 2017 11:00) (83 - 102)  BP: 136/83 (14 Dec 2017 11:00) (136/83 - 142/94)  BP(mean): --  RR: 18 (14 Dec 2017 11:00) (18 - 20)  SpO2: 100% (14 Dec 2017 11:00) (99% - 100%)    12-14    142  |  103  |  14  ----------------------------<  87  4.2   |  25  |  0.83    Ca    9.4      14 Dec 2017 11:52    TPro  7.9  /  Alb  4.7  /  TBili  1.0  /  DBili  x   /  AST  19  /  ALT  11  /  AlkPhos  46  12-14    12-14    142  |  103  |  14  ----------------------------<  87  4.2   |  25  |  0.83    Ca    9.4      14 Dec 2017 11:52    TPro  7.9  /  Alb  4.7  /  TBili  1.0  /  DBili  x   /  AST  19  /  ALT  11  /  AlkPhos  46  12-14    LIVER FUNCTIONS - ( 14 Dec 2017 11:52 )  Alb: 4.7 g/dL / Pro: 7.9 g/dL / ALK PHOS: 46 U/L / ALT: 11 U/L RC / AST: 19 U/L / GGT: x                                 13.5   7.9   )-----------( 178      ( 14 Dec 2017 11:52 )             40.8

## 2017-12-14 NOTE — ED PROVIDER NOTE - OBJECTIVE STATEMENT
41yo F pmh MS p/w LLE numbness [id number], worsened today preventing pt from sleeping. symptoms typical of pts MS flare, in prior flares has had LE weakness. Denies f/c, CP, SOB, N/V/D, abdominal pain, dysuria, trauma, back pain, hip pain, knee pain, LE weakness, urinary fecal incontinence.  neuro dr abbey ortiz, doesn't have pmd 43yo F pmh MS p/w LLE numbness [id number], worsened today preventing pt from sleeping. also had LLE weakness & difficulty walking symptoms typical of pts MS flare, . Denies f/c, CP, SOB, N/V/D, abdominal pain, dysuria, trauma, back pain, hip pain, knee pain, , urinary fecal incontinence.  neuro dr abbey ortiz, doesn't have pmd

## 2017-12-14 NOTE — H&P ADULT - ATTENDING COMMENTS
Patient is seen and examed.  Agree with resident's note.    Patient has history of MS, on Tecfidera.  She has increased numbness and weakness in left leg for 3 weeks.  Sent in by her neurologist for MS exacerbation.  Patient has left leg weakness on exam 4/5.  Numbness in left lower leg.  Antalgic gait.    She will be admitted for MRI of Brain , C t/L spine.  IV solumedrol 1000mg /day for 3-5 days.    GI and DVT prophylaxis.    PT eval.

## 2017-12-14 NOTE — ED ADULT NURSE NOTE - OBJECTIVE STATEMENT
42 year old female presents to the ED for left lower leg pain. Patient reports hx of MS, and states that this is what her exacerbations typically are like. Patient reports left lower leg pain for the last three weeks, worsening within the last three days. Patient reports numbness and tingling in the left foot, with increased tingling up the left leg.  Patient reports 8/10 pain in the left leg. Pedal pulses present bilaterally. Patient A+OX3. VSS. Patient resting comfortably in bed, 42 year old female presents to the ED for left lower leg pain. Patient reports hx of MS, and states that this is what her exacerbations typically feel like. Patient reports left lower leg pain for the last three weeks, worsening within the last three days. Patient reports numbness and tingling in the left foot, with increased tingling up the left leg.  Patient reports 8/10 pain in the left leg. Patient states that she started a new medication November 20th 2017, Tecfidera. Pedal pulses present bilaterally. Lung sounds clear bilaterally, cap refill <3 seconds. Patient A+OX3. VSS. No reports of SOB, chest pain, back pain, abdominal pain, blurry or double vision, fever, cough. No recent travel or hx of blood clots. Patient resting comfortably in bed, medications given via MD order.

## 2017-12-14 NOTE — H&P ADULT - ASSESSMENT
Pt is a 41 yo F with a PMH of MS (diagnosed in 1997, now on Tecfidera 240 QD) who is here for a new LLE numbness, weakness and pain for the past 3 weeks. Exam shows mild LLE weakness, worse proximally with subjective sensory deficits when compared to the right to light touch, temp and vibration. Likely MS exacerbation.    Plan:  - Solumedrol 500mg IV q12 x5 days  - MRI Head, C-Spine, T-Spine w/ and w/o contrast  - MRI L-Spine w/o contrast  - Neuro Checks q4hr  - Protonix for GI ppx  - Fall Precautions  - Regular Diet  - Continue home meds, will take own Tecfidra 240 QD  - Case discussed w/ Dr. Bryant Pt is a 43 yo F with a PMH of MS (diagnosed in 1997, now on Tecfidera 240 QD) who is here for a new LLE numbness, weakness and pain for the past 3 weeks. Exam shows mild LLE weakness, worse proximally with subjective sensory deficits when compared to the right to light touch, temp and vibration. Likely MS exacerbation.    Plan:  - Solumedrol 500mg IV q12 x5 days  - MRI Head, C-Spine, T-Spine w/ and w/o contrast  - MRI L-Spine w/o contrast  - Neuro Checks q4hr  - Protonix for GI ppx  - Fall Precautions  - Regular Diet  - Continue home meds, will take own Tecfidra 240 QD  - DVT PPX  - PT/OT  - Case discussed w/ Dr. Bryant

## 2017-12-14 NOTE — ED PROVIDER NOTE - PMH
Multiple sclerosis  relapsing/remitting - recent exacerbation June 2015 required hospitalization & steroid treament  Ovarian cyst    Patient is Mandaen    Uterine leiomyoma

## 2017-12-14 NOTE — H&P ADULT - NSHPPHYSICALEXAM_GEN_ALL_CORE
General Exam:   General appearance: No acute distress    Neurological Exam:  Mental Status: Orientated to self, date and place.  Attention intact.  No dysarthria. Speech fluent.  Cranial Nerves:   PERRL, EOMI, VFF, no nystagmus.    CN V1-3 intact to light touch b/l.  No facial asymmetry.  Hearing intact to finger rub bilaterally.  Tongue, uvula and palate midline.  Sternocleidomastoid and Trapezius intact bilaterally.    Motor:   Tone: normal.                  Strength:     [] Upper extremity                      Delt       Bicep    Tricep                                                  R         5/5 5/5 5/5 5/5                                               L          5/5 5/5 5/5       5/5  [] Lower extremity                       HF          KE          KF        DF         PF                                               R        5/5 5/5 5/5 5/5 5/5                                               L         4-/5 4/5 4/5       4+/5     4+/5  Pronator drift: none b/l                 Dysmetria: None to finger-nose-finger b/l  No truncal ataxia.    Tremor: No resting, postural or action tremor.  No myoclonus.    Sensation: intact to pinprick and proprioception throughout, decreased subjective sensation to light touch, vibration and temp in the LLE    Deep Tendon Reflexes:              Biceps      BR        Patellar        Ankle       Babinski                                         R       3+           3+        3+               3+           downgoing                                         L        3+           3+        3+               3+           downgoing    Gait: with some left foot drop

## 2017-12-15 ENCOUNTER — TRANSCRIPTION ENCOUNTER (OUTPATIENT)
Age: 42
End: 2017-12-15

## 2017-12-15 RX ORDER — PANTOPRAZOLE SODIUM 20 MG/1
1 TABLET, DELAYED RELEASE ORAL
Qty: 0 | Refills: 0 | COMMUNITY
Start: 2017-12-15

## 2017-12-15 RX ADMIN — Medication 100 MILLIGRAM(S): at 18:55

## 2017-12-15 RX ADMIN — Medication 800 MILLIGRAM(S): at 05:30

## 2017-12-15 RX ADMIN — Medication 800 MILLIGRAM(S): at 05:06

## 2017-12-15 RX ADMIN — Medication 100 MILLIGRAM(S): at 05:07

## 2017-12-15 RX ADMIN — PANTOPRAZOLE SODIUM 40 MILLIGRAM(S): 20 TABLET, DELAYED RELEASE ORAL at 05:07

## 2017-12-15 RX ADMIN — DIMETHYL FUMARATE 240 MILLIGRAM(S): 240 CAPSULE ORAL at 13:28

## 2017-12-15 RX ADMIN — Medication 4000 UNIT(S): at 13:25

## 2017-12-15 RX ADMIN — Medication 800 MILLIGRAM(S): at 13:55

## 2017-12-15 RX ADMIN — Medication 800 MILLIGRAM(S): at 13:26

## 2017-12-15 NOTE — DISCHARGE NOTE ADULT - MEDICATION SUMMARY - MEDICATIONS TO TAKE
I will START or STAY ON the medications listed below when I get home from the hospital:    Motrin 800 mg oral tablet  -- 1 tab(s) by mouth 3 times a day, As Needed  -- Indication: For Pain    Tecfidera 240 mg oral delayed release capsule  -- 1 tab(s) by mouth once a day  -- Indication: For Multiple sclerosis    pantoprazole 40 mg oral delayed release tablet  -- 1 tab(s) by mouth once a day (before a meal)  -- Indication: For Gastric Reflux    cholecalciferol oral tablet  -- 4000 unit(s) by mouth once a day  -- Indication: For Suppl I will START or STAY ON the medications listed below when I get home from the hospital:    Physical Therapy  -- 1   -- Indication: For therapy    Medrol Dosepak 4 mg oral tablet  -- 1 medrol dose alise   -- It is very important that you take or use this exactly as directed.  Do not skip doses or discontinue unless directed by your doctor.  Obtain medical advice before taking any non-prescription drugs as some may affect the action of this medication.  Take with food or milk.    -- Indication: For steroid taper    Motrin 800 mg oral tablet  -- 1 tab(s) by mouth 3 times a day, As Needed  -- Indication: For Pain    Valium 5 mg oral tablet  -- 1 tab(s) by mouth 2 times a day PRN spasms MDD:15 mg  -- Indication: For prn spasms    Tecfidera 240 mg oral delayed release capsule  -- 1 tab(s) by mouth once a day  -- Indication: For Multiple sclerosis    pantoprazole 40 mg oral delayed release tablet  -- 1 tab(s) by mouth once a day (before a meal)  -- Indication: For Gastric Reflux    cholecalciferol oral tablet  -- 4000 unit(s) by mouth once a day  -- Indication: For Suppl

## 2017-12-15 NOTE — DISCHARGE NOTE ADULT - CARE PROVIDER_API CALL
Alisia Bryant), Neurology  [address] [place] [address]  Phone: [phone]  Fax: [phone]

## 2017-12-15 NOTE — DISCHARGE NOTE ADULT - NS AS ACTIVITY OBS
Driving allowed/Showering allowed/Walking-Indoors allowed/Sex allowed/Stairs allowed/Walking-Outdoors allowed/Return to Work/School allowed

## 2017-12-15 NOTE — DISCHARGE NOTE ADULT - CARE PLAN
Principal Discharge DX:	Multiple sclerosis  Goal:	follow up with   Instructions for follow-up, activity and diet:	take medications as prescribed

## 2017-12-15 NOTE — DISCHARGE NOTE ADULT - PATIENT PORTAL LINK FT
“You can access the Follow[facility] Patient Portal, offered by [facility], by registering with the following website: [email or web address]”

## 2017-12-15 NOTE — DISCHARGE NOTE ADULT - HOSPITAL COURSE
Pt is a 41 yo F with a PMH of MS (diagnosed in 1997, now on Tecfidera 240 QD) who is here for a new LLE numbness, weakness and pain for the past 3 weeks. Pt endorses that 3 weeks ago she developed LLE numbness associated with increased sensation of coldness in the leg and subjective decreased sensation of cold. Sensation symptoms started in the forefoot and progressed to now involve the entire leg up to the thigh. The weakness started about 2 weeks ago and is causing gait difficulty, while the pain started a weeks ago. Pain is described as 8/10 (unrelieved by Motrin/Tramadol), sharp, shooting and includes the entire leg, nonreproducible. Of note pt does have chronic b/l LE pain, though this pain is different. Also of note she was following up with her neurologist (Dr. Asher) for difficult to describe sensations in the right thigh. Otherwise pt denies any fevers, chills, HA, changes in vision, CP, SOB, cough, nausea/vomiting, abdominal pain, changes in BMs/urination.    Patient was treated with IV methyl prednisolone for ____ days. MRI brain and MRI spine with and without contrast showed __________________________. PT evaluation was done and recommended _______________________________________, Tecfidera was continued during admission. Pt is a 41 yo F with a PMH of MS (diagnosed in 1997, now on Tecfidera 240 QD) who is here for a new LLE numbness, weakness and pain for the past 3 weeks. Pt endorses that 3 weeks ago she developed LLE numbness associated with increased sensation of coldness in the leg and subjective decreased sensation of cold. Sensation symptoms started in the forefoot and progressed to now involve the entire leg up to the thigh. The weakness started about 2 weeks ago and is causing gait difficulty, while the pain started a weeks ago. Pain is described as 8/10 (unrelieved by Motrin/Tramadol), sharp, shooting and includes the entire leg, nonreproducible. Of note pt does have chronic b/l LE pain, though this pain is different. Also of note she was following up with her neurologist (Dr. Asher) for difficult to describe sensations in the right thigh. Otherwise pt denies any fevers, chills, HA, changes in vision, CP, SOB, cough, nausea/vomiting, abdominal pain, changes in BMs/urination.    Patient was treated with IV methyl prednisolone for 3 days followed by a steroid taper. MRI brain and MRI spine with and without contrast showed "< from: MR Head w/wo IV Cont (12.16.17 @ 18:25) >  2 small enhancing lesions within the left periventricular white matter   compatible with areas of acute demyelination.    New demyelinating plaque when compared with the prior exam within the   anterior inferior right frontal lobe without associated enhancement to   suggest acute demyelination.    Otherwise stable exam."  < from: MR Cervical Spine w/wo IV Cont (12.16.17 @ 18:04) >    IMPRESSION:    Multiple foci of abnormal signal within the cervical and thoracic spinal   cord compatible with demyelinating plaques which is not changed   significantly.    No abnormal enhancement is seen at this time to suggest acute   demyelination.      < end of copied text >  Tecfidera was continued during admission.  Patient go home on a steroid taper with outpatient physical therapy.

## 2017-12-16 PROCEDURE — 72157 MRI CHEST SPINE W/O & W/DYE: CPT | Mod: 26

## 2017-12-16 PROCEDURE — 72156 MRI NECK SPINE W/O & W/DYE: CPT | Mod: 26

## 2017-12-16 PROCEDURE — 72148 MRI LUMBAR SPINE W/O DYE: CPT | Mod: 26

## 2017-12-16 PROCEDURE — 70553 MRI BRAIN STEM W/O & W/DYE: CPT | Mod: 26

## 2017-12-16 RX ORDER — DIAZEPAM 5 MG
5 TABLET ORAL ONCE
Qty: 0 | Refills: 0 | Status: DISCONTINUED | OUTPATIENT
Start: 2017-12-16 | End: 2017-12-16

## 2017-12-16 RX ADMIN — PANTOPRAZOLE SODIUM 40 MILLIGRAM(S): 20 TABLET, DELAYED RELEASE ORAL at 06:25

## 2017-12-16 RX ADMIN — Medication 5 MILLIGRAM(S): at 15:09

## 2017-12-16 RX ADMIN — Medication 4000 UNIT(S): at 12:27

## 2017-12-16 RX ADMIN — DIMETHYL FUMARATE 240 MILLIGRAM(S): 240 CAPSULE ORAL at 21:30

## 2017-12-16 RX ADMIN — Medication 100 MILLIGRAM(S): at 18:39

## 2017-12-16 RX ADMIN — Medication 100 MILLIGRAM(S): at 06:25

## 2017-12-16 NOTE — PROGRESS NOTE ADULT - SUBJECTIVE AND OBJECTIVE BOX
Patient is a 42y old  Female who presents with a chief complaint of LLE Weakness, Numbness, Pain (15 Dec 2017 11:46)      HPI:  pt seen, some improvement with steroids, no new complaints at this time      Vital Signs Last 24 Hrs  T(C): 36.7 (16 Dec 2017 08:17), Max: 37 (15 Dec 2017 16:15)  T(F): 98 (16 Dec 2017 08:17), Max: 98.6 (15 Dec 2017 16:15)  HR: 74 (16 Dec 2017 08:17) (72 - 87)  BP: 109/64 (16 Dec 2017 08:17) (105/66 - 121/81)  BP(mean): --  RR: 18 (16 Dec 2017 08:17) (16 - 18)  SpO2: 98% (16 Dec 2017 08:17) (98% - 100%)    Physical Exam    Mental Status- AAO x 3, speech fluent without dysarthria, memory and fund of knowledge intact  CN- 2-12 intact  Motor- mild L weakness  Sensory- dec LT  Coordination- dysmetria L H to shin  Gait- deferred

## 2017-12-16 NOTE — PROGRESS NOTE ADULT - ASSESSMENT
43yo F with hx MS p/w increase weakness, likely MS exacerbation  1. MRI pend  2. on iv solumedrol  3. pt

## 2017-12-16 NOTE — PHYSICAL THERAPY INITIAL EVALUATION ADULT - ADDITIONAL COMMENTS
Pt resides in 2nd floor apt with spouse & 16 year old son, 2 steps to enter, additional 10 steps to apt with handrails. PTA independent in mobility and ADL's, (+)driving.

## 2017-12-16 NOTE — PHYSICAL THERAPY INITIAL EVALUATION ADULT - PERTINENT HX OF CURRENT PROBLEM, REHAB EVAL
Pt is 42F admitted 12/14/17 [facility] MS(diagnosed 1997,on Tecfidera 240 QD); presents with new LLE numbness, weakness & pain x3 wks.

## 2017-12-16 NOTE — PHYSICAL THERAPY INITIAL EVALUATION ADULT - CRITERIA FOR SKILLED THERAPEUTIC INTERVENTIONS
pt functionally independent at this time, but presents with weakness of LLE, pt would benefit from outpatient PT after discharge

## 2017-12-16 NOTE — PHYSICAL THERAPY INITIAL EVALUATION ADULT - DISCHARGE DISPOSITION, PT EVAL
home with outpatient PT services for improved strength balance & endurance for activity, assist from family as necessary, pt cleared for DC home by PT standpoint at this time/home w/ outpatient services

## 2017-12-17 VITALS
RESPIRATION RATE: 18 BRPM | OXYGEN SATURATION: 98 % | DIASTOLIC BLOOD PRESSURE: 81 MMHG | HEART RATE: 66 BPM | TEMPERATURE: 98 F | SYSTOLIC BLOOD PRESSURE: 122 MMHG

## 2017-12-17 PROCEDURE — 70553 MRI BRAIN STEM W/O & W/DYE: CPT

## 2017-12-17 PROCEDURE — 83605 ASSAY OF LACTIC ACID: CPT

## 2017-12-17 PROCEDURE — 84132 ASSAY OF SERUM POTASSIUM: CPT

## 2017-12-17 PROCEDURE — A9585: CPT

## 2017-12-17 PROCEDURE — 97165 OT EVAL LOW COMPLEX 30 MIN: CPT

## 2017-12-17 PROCEDURE — 82330 ASSAY OF CALCIUM: CPT

## 2017-12-17 PROCEDURE — 72148 MRI LUMBAR SPINE W/O DYE: CPT

## 2017-12-17 PROCEDURE — 82803 BLOOD GASES ANY COMBINATION: CPT

## 2017-12-17 PROCEDURE — 84295 ASSAY OF SERUM SODIUM: CPT

## 2017-12-17 PROCEDURE — 82550 ASSAY OF CK (CPK): CPT

## 2017-12-17 PROCEDURE — 99285 EMERGENCY DEPT VISIT HI MDM: CPT

## 2017-12-17 PROCEDURE — 85014 HEMATOCRIT: CPT

## 2017-12-17 PROCEDURE — 84702 CHORIONIC GONADOTROPIN TEST: CPT

## 2017-12-17 PROCEDURE — 72156 MRI NECK SPINE W/O & W/DYE: CPT

## 2017-12-17 PROCEDURE — 82435 ASSAY OF BLOOD CHLORIDE: CPT

## 2017-12-17 PROCEDURE — 97161 PT EVAL LOW COMPLEX 20 MIN: CPT

## 2017-12-17 PROCEDURE — 72157 MRI CHEST SPINE W/O & W/DYE: CPT

## 2017-12-17 PROCEDURE — 80053 COMPREHEN METABOLIC PANEL: CPT

## 2017-12-17 PROCEDURE — 85027 COMPLETE CBC AUTOMATED: CPT

## 2017-12-17 PROCEDURE — 82947 ASSAY GLUCOSE BLOOD QUANT: CPT

## 2017-12-17 RX ORDER — DIAZEPAM 5 MG
1 TABLET ORAL
Qty: 30 | Refills: 0 | OUTPATIENT
Start: 2017-12-17 | End: 2017-12-31

## 2017-12-17 RX ORDER — DIAZEPAM 5 MG
5 TABLET ORAL ONCE
Qty: 0 | Refills: 0 | Status: DISCONTINUED | OUTPATIENT
Start: 2017-12-17 | End: 2017-12-17

## 2017-12-17 RX ADMIN — PANTOPRAZOLE SODIUM 40 MILLIGRAM(S): 20 TABLET, DELAYED RELEASE ORAL at 05:33

## 2017-12-17 RX ADMIN — Medication 4000 UNIT(S): at 13:08

## 2017-12-17 RX ADMIN — DIMETHYL FUMARATE 240 MILLIGRAM(S): 240 CAPSULE ORAL at 13:09

## 2017-12-17 RX ADMIN — Medication 5 MILLIGRAM(S): at 15:00

## 2017-12-17 RX ADMIN — Medication 100 MILLIGRAM(S): at 18:30

## 2017-12-17 RX ADMIN — Medication 100 MILLIGRAM(S): at 05:32

## 2017-12-17 NOTE — PROGRESS NOTE ADULT - ATTENDING COMMENTS
pt reports some improvement, unchanged neuro exam. May dc later today on medrol dose pack, valium prn and fu in my office. Follow up official MRI report

## 2017-12-17 NOTE — OCCUPATIONAL THERAPY INITIAL EVALUATION ADULT - PERTINENT HX OF CURRENT PROBLEM, REHAB EVAL
43 yo F with a PMH of MS, here for new LLE numbness, weakness, numbness with cold sensation, & pain for the past 3 weeks. Sensation symptoms started in forefoot &progressed to entire leg. Pain is 8/10,sharp &shooting.

## 2017-12-17 NOTE — OCCUPATIONAL THERAPY INITIAL EVALUATION ADULT - GENERAL OBSERVATIONS, REHAB EVAL
Pt encountered sitting in bedside chair, fully dressed (dressed independently), NAD, VSS, +IVL, +friend

## 2017-12-17 NOTE — PROGRESS NOTE ADULT - SUBJECTIVE AND OBJECTIVE BOX
Patient is a 42y old  Female who presents with a chief complaint of LLE Weakness, Numbness, Pain (15 Dec 2017 11:46)      HPI:  pt seen, some improvement with steroids, no new complaints at this time other than mild stomach quesiness with steroids      Vital Signs Last 24 Hrs  T(C): 37 (17 Dec 2017 08:16), Max: 37 (17 Dec 2017 08:16)  T(F): 98.6 (17 Dec 2017 08:16), Max: 98.6 (17 Dec 2017 08:16)  HR: 64 (17 Dec 2017 08:16) (64 - 100)  BP: 121/74 (17 Dec 2017 08:16) (114/70 - 127/81)  BP(mean): --  RR: 18 (17 Dec 2017 08:16) (16 - 18)  SpO2: 98% (17 Dec 2017 08:16) (97% - 100%)  Physical Exam    Mental Status- AAO x 3, speech fluent without dysarthria, memory and fund of knowledge intact  CN- 2-12 intact  Motor- mild L weakness  Sensory- dec LT in left inner leg/foot  Reflexes: hyperreflexia throughout  Coordination- dysmetria L H to shin  Gait- deferred

## 2017-12-17 NOTE — OCCUPATIONAL THERAPY INITIAL EVALUATION ADULT - LIVES WITH, PROFILE
children/spouse/Pt lives with spouse and 17 yo son in a second floor apt, 2-3 stairs to enter building, and 1 flight of stairs to apt. Pt has a tub shower, and was previously independent in all ADLS/IADLs. Pt is right handed, and wears glasses when needed.

## 2017-12-27 DIAGNOSIS — R69 ILLNESS, UNSPECIFIED: ICD-10-CM

## 2018-03-28 ENCOUNTER — APPOINTMENT (OUTPATIENT)
Dept: SURGERY | Facility: CLINIC | Age: 43
End: 2018-03-28
Payer: MEDICAID

## 2018-03-28 VITALS
TEMPERATURE: 98.3 F | SYSTOLIC BLOOD PRESSURE: 129 MMHG | HEART RATE: 94 BPM | WEIGHT: 123 LBS | HEIGHT: 59 IN | BODY MASS INDEX: 24.8 KG/M2 | DIASTOLIC BLOOD PRESSURE: 92 MMHG | OXYGEN SATURATION: 100 % | RESPIRATION RATE: 16 BRPM

## 2018-03-28 DIAGNOSIS — Z82.49 FAMILY HISTORY OF ISCHEMIC HEART DISEASE AND OTHER DISEASES OF THE CIRCULATORY SYSTEM: ICD-10-CM

## 2018-03-28 DIAGNOSIS — Z87.898 PERSONAL HISTORY OF OTHER SPECIFIED CONDITIONS: ICD-10-CM

## 2018-03-28 DIAGNOSIS — Z87.42 PERSONAL HISTORY OF OTHER DISEASES OF THE FEMALE GENITAL TRACT: ICD-10-CM

## 2018-03-28 DIAGNOSIS — Z86.018 PERSONAL HISTORY OF OTHER BENIGN NEOPLASM: ICD-10-CM

## 2018-03-28 PROCEDURE — 46600 DIAGNOSTIC ANOSCOPY SPX: CPT

## 2018-03-28 PROCEDURE — 99244 OFF/OP CNSLTJ NEW/EST MOD 40: CPT | Mod: 25

## 2018-04-10 ENCOUNTER — OUTPATIENT (OUTPATIENT)
Dept: OUTPATIENT SERVICES | Facility: HOSPITAL | Age: 43
LOS: 1 days | End: 2018-04-10
Payer: MEDICAID

## 2018-04-10 VITALS
HEIGHT: 59 IN | OXYGEN SATURATION: 99 % | HEART RATE: 81 BPM | SYSTOLIC BLOOD PRESSURE: 131 MMHG | TEMPERATURE: 99 F | WEIGHT: 123.9 LBS | DIASTOLIC BLOOD PRESSURE: 87 MMHG | RESPIRATION RATE: 16 BRPM

## 2018-04-10 DIAGNOSIS — Z98.89 OTHER SPECIFIED POSTPROCEDURAL STATES: Chronic | ICD-10-CM

## 2018-04-10 DIAGNOSIS — O34.21 MATERNAL CARE FOR SCAR FROM PREVIOUS CESAREAN DELIVERY: Chronic | ICD-10-CM

## 2018-04-10 DIAGNOSIS — O34.529: Chronic | ICD-10-CM

## 2018-04-10 DIAGNOSIS — K64.9 UNSPECIFIED HEMORRHOIDS: ICD-10-CM

## 2018-04-10 DIAGNOSIS — Z01.818 ENCOUNTER FOR OTHER PREPROCEDURAL EXAMINATION: ICD-10-CM

## 2018-04-10 DIAGNOSIS — K64.4 RESIDUAL HEMORRHOIDAL SKIN TAGS: ICD-10-CM

## 2018-04-10 DIAGNOSIS — Z78.9 OTHER SPECIFIED HEALTH STATUS: ICD-10-CM

## 2018-04-10 DIAGNOSIS — Z90.710 ACQUIRED ABSENCE OF BOTH CERVIX AND UTERUS: Chronic | ICD-10-CM

## 2018-04-10 DIAGNOSIS — N63 UNSPECIFIED LUMP IN BREAST: Chronic | ICD-10-CM

## 2018-04-10 LAB
ANION GAP SERPL CALC-SCNC: 14 MMOL/L — SIGNIFICANT CHANGE UP (ref 5–17)
BUN SERPL-MCNC: 18 MG/DL — SIGNIFICANT CHANGE UP (ref 7–23)
CALCIUM SERPL-MCNC: 9.8 MG/DL — SIGNIFICANT CHANGE UP (ref 8.4–10.5)
CHLORIDE SERPL-SCNC: 102 MMOL/L — SIGNIFICANT CHANGE UP (ref 96–108)
CO2 SERPL-SCNC: 24 MMOL/L — SIGNIFICANT CHANGE UP (ref 22–31)
CREAT SERPL-MCNC: 0.98 MG/DL — SIGNIFICANT CHANGE UP (ref 0.5–1.3)
GLUCOSE SERPL-MCNC: 86 MG/DL — SIGNIFICANT CHANGE UP (ref 70–99)
HCT VFR BLD CALC: 41 % — SIGNIFICANT CHANGE UP (ref 34.5–45)
HGB BLD-MCNC: 14.2 G/DL — SIGNIFICANT CHANGE UP (ref 11.5–15.5)
MCHC RBC-ENTMCNC: 30.5 PG — SIGNIFICANT CHANGE UP (ref 27–34)
MCHC RBC-ENTMCNC: 34.6 GM/DL — SIGNIFICANT CHANGE UP (ref 32–36)
MCV RBC AUTO: 88.2 FL — SIGNIFICANT CHANGE UP (ref 80–100)
PLATELET # BLD AUTO: 292 K/UL — SIGNIFICANT CHANGE UP (ref 150–400)
POTASSIUM SERPL-MCNC: 4 MMOL/L — SIGNIFICANT CHANGE UP (ref 3.5–5.3)
POTASSIUM SERPL-SCNC: 4 MMOL/L — SIGNIFICANT CHANGE UP (ref 3.5–5.3)
RBC # BLD: 4.65 M/UL — SIGNIFICANT CHANGE UP (ref 3.8–5.2)
RBC # FLD: 12.6 % — SIGNIFICANT CHANGE UP (ref 10.3–14.5)
SODIUM SERPL-SCNC: 140 MMOL/L — SIGNIFICANT CHANGE UP (ref 135–145)
WBC # BLD: 7.34 K/UL — SIGNIFICANT CHANGE UP (ref 3.8–10.5)
WBC # FLD AUTO: 7.34 K/UL — SIGNIFICANT CHANGE UP (ref 3.8–10.5)

## 2018-04-10 PROCEDURE — 85027 COMPLETE CBC AUTOMATED: CPT

## 2018-04-10 PROCEDURE — G0463: CPT

## 2018-04-10 PROCEDURE — 80048 BASIC METABOLIC PNL TOTAL CA: CPT

## 2018-04-10 RX ORDER — SODIUM CHLORIDE 9 MG/ML
3 INJECTION INTRAMUSCULAR; INTRAVENOUS; SUBCUTANEOUS EVERY 8 HOURS
Qty: 0 | Refills: 0 | Status: DISCONTINUED | OUTPATIENT
Start: 2018-04-16 | End: 2018-05-01

## 2018-04-10 RX ORDER — ACETAMINOPHEN 500 MG
1000 TABLET ORAL ONCE
Qty: 0 | Refills: 0 | Status: COMPLETED | OUTPATIENT
Start: 2018-04-16 | End: 2018-04-16

## 2018-04-10 RX ORDER — DIMETHYL FUMARATE 240 MG/1
1 CAPSULE ORAL
Qty: 0 | Refills: 0 | COMMUNITY

## 2018-04-10 NOTE — H&P PST ADULT - HISTORY OF PRESENT ILLNESS
Pt. is a Religion. 42 y.o. female with h/o MS (diagnosed in 1997, was on Tecfidera, now off due to side effects, last flare-up 12/2017, treated with steroids), known h/o constipation and hemorrhoids for many years c/o worsening of symptoms. She is scheduled for Hemorrhoidectomy on 04/16/18.    Of note, patient is  Baptism. Surgeon notified.

## 2018-04-10 NOTE — H&P PST ADULT - PSH
Delivery with history of     H/O hysterectomy for benign disease    H/O ovarian cystectomy    Left breast mass  surgical excision, benign  S/P laparoscopic hysterectomy  Bilateral Salpingectomy, Lysis of Adhesions,   Status post hysteroscopy  uterine polypectomy   Status post tonsillectomy  age 19 Delivery with history of     H/O ovarian cystectomy    Left breast mass  surgical excision, benign  S/P laparoscopic hysterectomy  Bilateral Salpingectomy, Lysis of Adhesions,   Status post hysteroscopy  uterine polypectomy,   Status post tonsillectomy  age 19

## 2018-04-10 NOTE — H&P PST ADULT - PMH
Hemorrhoids    Multiple sclerosis  relapsing/remitting - recent exacerbation 12/2017 required hospitalization & steroid treament  Ovarian cyst    Patient is Oriental orthodox    Uterine leiomyoma Hemorrhoids    Multiple sclerosis  relapsing/remitting - recent exacerbation 12/2017 required hospitalization & steroid treatment  Ovarian cyst    Pain in both lower extremities  with intermittent weakness  Patient is Protestant    Uterine leiomyoma

## 2018-04-10 NOTE — H&P PST ADULT - DOES PATIENT HAVE ADVANCE DIRECTIVE
History  Chief Complaint   Patient presents with    Shortness of Breath     Pt from [facility] assisted living, states she has been SOB x3 weeks  Staff states today pulse ox dropped to 81% on room air  Patient presents via EMS from [facility] personal care facility for complaint of dyspnea on exertion over the past 1-2 weeks  She does not use home oxygen and has no prior diagnosis of COPD or Congestive heart failure  She denies any recent illnesses  She did fall about a week ago after losing her balance while turning and struck her left shoulder and head  She was medically evaluated that time without injury  Amie Jaime noted a pulse ox of 85% but did not have oxygen to give the patient  EMS confirmed similar pulse oxygenation and applied 4 liters/minute nasal cannula oxygen with resolution of dyspnea and improved oxygenation  Recheck on room air upon ED arrival demonstrated 81% pulse ox with again improvement into the mid 90s on low-flow oxygen  She denies concomitant chest pain, lightheadedness/dizziness, nausea/vomiting or diaphoresis  She is a lifelong nonsmoker  No recent travel or sick contacts  Denies f/c, HA, abdominal pain, diarrhea or dysuria  12 system ROS o/w negative  During my examination the patient was able to speak full sentences for prolonged period without difficulty or change in pulse oxygenation while on supplemental oxygen  History provided by:  Patient, medical records, EMS personnel and nursing home  Shortness of Breath   Severity:  Mild  Onset quality:  Unable to specify  Duration:  2 weeks  Timing:  Intermittent  Progression:  Waxing and waning  Chronicity:  New  Context: activity    Context: not URI    Relieved by:  Rest and oxygen  Worsened by:   Activity and exertion  Associated symptoms: no abdominal pain, no chest pain, no claudication, no cough, no diaphoresis, no fever, no headaches, no neck pain, no PND, no rash, no sore throat, no sputum production, no syncope, no swollen glands, no vomiting and no wheezing    Risk factors: no recent alcohol use, no hx of cancer, no hx of PE/DVT, no obesity, no prolonged immobilization, no recent surgery and no tobacco use        Prior to Admission Medications   Prescriptions Last Dose Informant Patient Reported? Taking? Calcium Carbonate-Vitamin D 600-200 MG-UNIT CAPS 1/3/2018 at Unknown time  Yes Yes   Sig: Take 2 tablets by mouth daily   Ergocalciferol (VITAMIN D2 PO) Past Month at Unknown time  Yes Yes   Sig: Take 50,000 Units by mouth every 30 (thirty) days   Multiple Vitamin (MULTIVITAMIN) capsule 1/3/2018 at Unknown time  Yes Yes   Sig: Take 1 capsule by mouth daily   Nutritional Supplements (ENSURE ACTIVE PO) 1/3/2018 at Unknown time  Yes Yes   Sig: Take by mouth   acetaminophen (TYLENOL) 500 mg tablet Past Month at Unknown time  Yes Yes   Sig: Take 325 mg by mouth every 6 (six) hours as needed for mild pain     aspirin 81 MG tablet 1/3/2018 at Unknown time  Yes Yes   Sig: Take 81 mg by mouth daily  bisacodyl (bisacodyl) 5 mg EC tablet Past Month at Unknown time  Yes Yes   Sig: Take 10 mg by mouth daily as needed for constipation   carvedilol (COREG) 3 125 mg tablet 1/3/2018 at Unknown time  Yes Yes   Sig: Take 6 25 mg by mouth every morning     diltiazem (DILACOR XR) 180 MG 24 hr capsule 1/3/2018 at Unknown time  Yes Yes   Sig: Take 180 mg by mouth daily  docusate sodium (COLACE) 100 mg capsule 1/3/2018 at Unknown time  No Yes   Sig: Take 1 capsule by mouth 2 (two) times a day   gabapentin (NEURONTIN) 100 mg capsule 1/3/2018 at Unknown time  Yes Yes   Sig: Take 300 mg by mouth daily at bedtime   menthol-zinc oxide (RISAMINE) 0 44-20 625 % 1/3/2018 at Unknown time  Yes Yes   Sig: Apply 1 application topically 2 (two) times a day   pantoprazole (PROTONIX) 40 mg tablet 1/4/2018 at Unknown time  Yes Yes   Sig: Take 40 mg by mouth daily     polyvinyl alcohol-povidone (REFRESH) 1 4-0 6 % ophthalmic solution 1/3/2018 at Unknown time  Yes Yes   Si drop 3 (three) times a day   simvastatin (ZOCOR) 10 mg tablet 1/3/2018 at Unknown time  Yes Yes   Sig: Take 10 mg by mouth daily  sorbitol 70 % solution Past Month at Unknown time  Yes Yes   Sig: Take 30 mL by mouth daily as needed   traMADol (ULTRAM) 50 mg tablet 1/3/2018 at Unknown time  Yes Yes   Sig: Take 50 mg by mouth 2 (two) times a day      Facility-Administered Medications: None       Past Medical History:   Diagnosis Date    Angina pectoris ([address] )     Arthritis     CAD (coronary artery disease)     GERD (gastroesophageal reflux disease)     Hypertension     Lyme disease     Osteoporosis     Vertigo        Past Surgical History:   Procedure Laterality Date    AORTIC VALVE REPLACEMENT      APPENDECTOMY      EYE SURGERY      cataract bilateral       History reviewed  No pertinent family history  I have reviewed and agree with the history as documented  Social History   Substance Use Topics    Smoking status: Never Smoker    Smokeless tobacco: Never Used    Alcohol use No        Review of Systems   Constitutional: Negative for activity change, chills, diaphoresis and fever  HENT: Negative for rhinorrhea, sore throat and trouble swallowing  Respiratory: Positive for shortness of breath (on exertion)  Negative for cough, sputum production, chest tightness and wheezing  Cardiovascular: Negative for chest pain, palpitations, claudication, leg swelling, syncope and PND  Gastrointestinal: Negative for abdominal distention, abdominal pain, constipation, diarrhea, nausea and vomiting  Genitourinary: Negative for difficulty urinating, dysuria, flank pain, frequency, hematuria and urgency  Musculoskeletal: Negative for arthralgias, back pain, myalgias, neck pain and neck stiffness  Skin: Negative for pallor and rash  Neurological: Negative for dizziness, weakness, light-headedness and headaches  Hematological: Negative for adenopathy  Psychiatric/Behavioral: Negative for confusion and sleep disturbance  The patient is not nervous/anxious  All other systems reviewed and are negative  Physical Exam  ED Triage Vitals   Temperature Pulse Respirations Blood Pressure SpO2   01/04/18 1024 01/04/18 1024 01/04/18 1024 01/04/18 1024 01/04/18 1023   98 1 °F (36 7 °C) 85 20 138/63 (!) 85 %      Temp Source Heart Rate Source Patient Position - Orthostatic VS BP Location FiO2 (%)   01/04/18 1024 01/04/18 1024 01/04/18 1024 01/04/18 1024 --   Temporal Monitor Lying Left arm       Pain Score       01/04/18 1024       No Pain           Orthostatic Vital Signs  Vitals:    01/04/18 1024 01/04/18 1030   BP: 138/63 118/56   Pulse: 85 87   Patient Position - Orthostatic VS: Lying        Physical Exam   Constitutional: She is oriented to person, place, and time  She appears well-developed and well-nourished  No distress  HENT:   Head: Normocephalic  Mouth/Throat: Oropharynx is clear and moist    Eyes: Conjunctivae and EOM are normal  Pupils are equal, round, and reactive to light  Neck: Normal range of motion  Neck supple  Cardiovascular: Normal rate and regular rhythm  No murmur heard  Pulmonary/Chest: Effort normal and breath sounds normal    Abdominal: Soft  Bowel sounds are normal  She exhibits no distension  There is no tenderness  Musculoskeletal: Normal range of motion  She exhibits no edema or tenderness  Lymphadenopathy:     She has no cervical adenopathy  Neurological: She is alert and oriented to person, place, and time  She has normal reflexes  No cranial nerve deficit or sensory deficit  She exhibits normal muscle tone  Skin: Skin is warm and dry  Capillary refill takes less than 2 seconds  No rash noted  She is not diaphoretic  No erythema  No pallor  Psychiatric: She has a normal mood and affect  Her behavior is normal  Thought content normal    Vitals reviewed        ED Medications  Medications - No data to display    Diagnostic Studies  Results Reviewed     Procedure Component Value Units Date/Time    Basic metabolic panel [[id number]]  (Abnormal) Collected:  01/04/18 1052    Lab Status:  Final result Specimen:  Blood from Line, Venous Updated:  01/04/18 1121     Sodium 143 mmol/L      Potassium 3 9 mmol/L      Chloride 106 mmol/L      CO2 29 mmol/L      Anion Gap 8 mmol/L      BUN 18 mg/dL      Creatinine 1 17 mg/dL      Glucose 198 (H) mg/dL      Calcium 8 8 mg/dL      eGFR 40 ml/min/1 73sq m     Narrative:         National Kidney Disease Education Program recommendations are as follows:  GFR calculation is accurate only with a steady state creatinine  Chronic Kidney disease less than 60 ml/min/1 73 sq  meters  Kidney failure less than 15 ml/min/1 73 sq  meters  B-type natriuretic peptide [[id number]]  (Abnormal) Collected:  01/04/18 1052    Lab Status:  Final result Specimen:  Blood from Line, Venous Updated:  01/04/18 1121     NT-proBNP 2,818 (H) pg/mL     Troponin I [[id number]]  (Normal) Collected:  01/04/18 1052    Lab Status:  Final result Specimen:  Blood from Line, Venous Updated:  01/04/18 1118     Troponin I <0 02 ng/mL     Narrative:         [employer] Chemistry analyzer 99% cutoff is > 0 04 ng/mL in network labs    o cTnI 99% cutoff is useful only when applied to patients in the clinical setting of myocardial ischemia  o cTnI 99% cutoff should be interpreted in the context of clinical history, ECG findings and possibly cardiac imaging to establish correct diagnosis  o cTnI 99% cutoff may be suggestive but clearly not indicative of a coronary event without the clinical setting of myocardial ischemia      CBC and differential [[id number]]  (Abnormal) Collected:  01/04/18 1052    Lab Status:  Final result Specimen:  Blood from Line, Venous Updated:  01/04/18 1058     WBC 7 63 Thousand/uL      RBC 3 61 (L) Million/uL      Hemoglobin 11 2 (L) g/dL      Hematocrit 34 4 (L) %      MCV 95 fL      MCH 31 0 pg      MCHC 32 6 g/dL      RDW 14 9 %      MPV 8 2 (L) fL      Platelets 775 Thousands/uL      Neutrophils Relative 63 %      Lymphocytes Relative 21 %      Monocytes Relative 6 %      Eosinophils Relative 9 (H) %      Basophils Relative 1 %      Neutrophils Absolute 4 91 Thousands/µL      Lymphocytes Absolute 1 60 Thousands/µL      Monocytes Absolute 0 43 Thousand/µL      Eosinophils Absolute 0 65 (H) Thousand/µL      Basophils Absolute 0 04 Thousands/µL                  XR chest 2 views   ED Interpretation by Mustapha Gutierrez DO (01/04 1106)   Diffuse interstitial lung disease similar to previous x-ray  Final Result by Andrea Scales DO (01/04 1133)   1  Chronic interstitial lung disease  2   Diffuse interstitial alveolar infiltrates within the lungs bilaterally with more focal area of consolidation in the left lower lobe  Although the findings may be cardiogenic in nature, superimposed pneumonia not excluded  Follow-up recommended  A verbal report will be called by the reading room liaison following this dictation  Workstation performed: [id number]                    Procedures  Procedures       Phone Contacts  ED Phone Contact    ED Course  ED Course as of Jan 04 1151   Thu Jan 04, 2018   1122 Was 1444 nine months ago NT-proBNP: (!) 2,818   1146 Patient admitted to [facility]            HEART Risk Score    Flowsheet Row Most Recent Value   History  0 Filed at: 01/04/2018 1120   ECG  1 Filed at: 01/04/2018 1120   Age  2 Filed at: 01/04/2018 1120   Risk Factors  1 Filed at: 01/04/2018 1120   Troponin  0 Filed at: 01/04/2018 1120   Heart Score Risk Calculator   History  0 Filed at: 01/04/2018 1120   ECG  1 Filed at: 01/04/2018 1120   Age  2 Filed at: 01/04/2018 1120   Risk Factors  1 Filed at: 01/04/2018 1120   Troponin  0 Filed at: 01/04/2018 1120   HEART Score  4 Filed at: 01/04/2018 1120   HEART Score  4 Filed at: 01/04/2018 1120                            MDM  Number of Diagnoses or Management Options  Diagnosis management comments: DDx:  Dyspnea on exertion - anemia, MENDOZA, abnormal electrolytes, emphysema, bronchitis, doubt ACS/MI, pneumonia or PE  A/P: Will check cardiac/metabolic workup, treat symptoms, reevaluate for further work up and disposition  Amount and/or Complexity of Data Reviewed  Clinical lab tests: ordered and reviewed  Tests in the radiology section of CPT®: ordered and reviewed  Obtain history from someone other than the patient: yes ([address] staff, EMS)  Review and summarize past medical records: yes      The patient presented with a condition in which there was a high probability of imminent or life-threatening deterioration, and critical care services (excluding separately billable procedures) totalled 30-74 minutes  Disposition  Final diagnoses:   Dyspnea on exertion   New onset of congestive heart failure ([address] )   Hypoxia     Time reflects when diagnosis was documented in both MDM as applicable and the Disposition within this note     Time User Action Codes Description Comment    1/4/2018 11:50 AM Fan Belle [R06 09] Dyspnea on exertion     1/4/2018 11:50 AM Isidro So Add [I50 9] New onset of congestive heart failure ([facility] [address] )     1/4/2018 11:50 AM Fan Payne Add [R09 02] Hypoxia       ED Disposition     ED Disposition Condition Comment    Admit  Case was discussed with Dr Anuel Velez and the patient's admission status was agreed to be Admission Status: inpatient status to the service of Dr Anuel Velez    Follow-up Information    None       Patient's Medications   Discharge Prescriptions    No medications on file     No discharge procedures on file      ED Provider  Electronically Signed by           Joni Marie DO  01/04/18 0669 No

## 2018-04-10 NOTE — H&P PST ADULT - NSANTHOSAYNRD_GEN_A_CORE
No. SOFIE screening performed.  STOP BANG Legend: 0-2 = LOW Risk; 3-4 = INTERMEDIATE Risk; 5-8 = HIGH Risk

## 2018-04-15 ENCOUNTER — APPOINTMENT (OUTPATIENT)
Dept: MRI IMAGING | Facility: CLINIC | Age: 43
End: 2018-04-15
Payer: MEDICAID

## 2018-04-15 ENCOUNTER — TRANSCRIPTION ENCOUNTER (OUTPATIENT)
Age: 43
End: 2018-04-15

## 2018-04-15 ENCOUNTER — OUTPATIENT (OUTPATIENT)
Dept: OUTPATIENT SERVICES | Facility: HOSPITAL | Age: 43
LOS: 1 days | End: 2018-04-15
Payer: MEDICAID

## 2018-04-15 DIAGNOSIS — Z90.710 ACQUIRED ABSENCE OF BOTH CERVIX AND UTERUS: Chronic | ICD-10-CM

## 2018-04-15 DIAGNOSIS — Z98.89 OTHER SPECIFIED POSTPROCEDURAL STATES: Chronic | ICD-10-CM

## 2018-04-15 DIAGNOSIS — Z00.8 ENCOUNTER FOR OTHER GENERAL EXAMINATION: ICD-10-CM

## 2018-04-15 DIAGNOSIS — N63 UNSPECIFIED LUMP IN BREAST: Chronic | ICD-10-CM

## 2018-04-15 DIAGNOSIS — O34.21 MATERNAL CARE FOR SCAR FROM PREVIOUS CESAREAN DELIVERY: Chronic | ICD-10-CM

## 2018-04-15 PROCEDURE — 70553 MRI BRAIN STEM W/O & W/DYE: CPT | Mod: 26

## 2018-04-15 PROCEDURE — 72157 MRI CHEST SPINE W/O & W/DYE: CPT | Mod: 26

## 2018-04-15 PROCEDURE — 72156 MRI NECK SPINE W/O & W/DYE: CPT | Mod: 26

## 2018-04-15 PROCEDURE — 72157 MRI CHEST SPINE W/O & W/DYE: CPT

## 2018-04-15 PROCEDURE — 70553 MRI BRAIN STEM W/O & W/DYE: CPT

## 2018-04-15 PROCEDURE — 72156 MRI NECK SPINE W/O & W/DYE: CPT

## 2018-04-15 RX ORDER — SODIUM CHLORIDE 9 MG/ML
1000 INJECTION, SOLUTION INTRAVENOUS
Qty: 0 | Refills: 0 | Status: DISCONTINUED | OUTPATIENT
Start: 2018-04-16 | End: 2018-05-01

## 2018-04-15 RX ORDER — ONDANSETRON 8 MG/1
4 TABLET, FILM COATED ORAL ONCE
Qty: 0 | Refills: 0 | Status: DISCONTINUED | OUTPATIENT
Start: 2018-04-16 | End: 2018-05-01

## 2018-04-15 RX ORDER — OXYCODONE HYDROCHLORIDE 5 MG/1
5 TABLET ORAL ONCE
Qty: 0 | Refills: 0 | Status: DISCONTINUED | OUTPATIENT
Start: 2018-04-16 | End: 2018-04-16

## 2018-04-16 ENCOUNTER — RESULT REVIEW (OUTPATIENT)
Age: 43
End: 2018-04-16

## 2018-04-16 ENCOUNTER — APPOINTMENT (OUTPATIENT)
Dept: SURGERY | Facility: HOSPITAL | Age: 43
End: 2018-04-16
Payer: MEDICAID

## 2018-04-16 ENCOUNTER — OUTPATIENT (OUTPATIENT)
Dept: OUTPATIENT SERVICES | Facility: HOSPITAL | Age: 43
LOS: 1 days | End: 2018-04-16
Payer: MEDICAID

## 2018-04-16 VITALS
SYSTOLIC BLOOD PRESSURE: 120 MMHG | DIASTOLIC BLOOD PRESSURE: 69 MMHG | TEMPERATURE: 98 F | OXYGEN SATURATION: 100 % | HEART RATE: 91 BPM | RESPIRATION RATE: 16 BRPM

## 2018-04-16 VITALS
SYSTOLIC BLOOD PRESSURE: 147 MMHG | DIASTOLIC BLOOD PRESSURE: 87 MMHG | HEIGHT: 59 IN | HEART RATE: 93 BPM | TEMPERATURE: 99 F | OXYGEN SATURATION: 100 % | RESPIRATION RATE: 20 BRPM | WEIGHT: 123.9 LBS

## 2018-04-16 DIAGNOSIS — N63 UNSPECIFIED LUMP IN BREAST: Chronic | ICD-10-CM

## 2018-04-16 DIAGNOSIS — Z98.89 OTHER SPECIFIED POSTPROCEDURAL STATES: Chronic | ICD-10-CM

## 2018-04-16 DIAGNOSIS — O34.21 MATERNAL CARE FOR SCAR FROM PREVIOUS CESAREAN DELIVERY: Chronic | ICD-10-CM

## 2018-04-16 DIAGNOSIS — K64.4 RESIDUAL HEMORRHOIDAL SKIN TAGS: ICD-10-CM

## 2018-04-16 DIAGNOSIS — Z90.710 ACQUIRED ABSENCE OF BOTH CERVIX AND UTERUS: Chronic | ICD-10-CM

## 2018-04-16 PROCEDURE — 88304 TISSUE EXAM BY PATHOLOGIST: CPT | Mod: 26

## 2018-04-16 PROCEDURE — 88304 TISSUE EXAM BY PATHOLOGIST: CPT

## 2018-04-16 PROCEDURE — 46260 REMOVE IN/EX HEM GROUPS 2+: CPT

## 2018-04-16 PROCEDURE — C1889: CPT

## 2018-04-16 RX ORDER — CHOLECALCIFEROL (VITAMIN D3) 125 MCG
1 CAPSULE ORAL
Qty: 0 | Refills: 0 | COMMUNITY

## 2018-04-16 RX ORDER — IBUPROFEN 200 MG
1 TABLET ORAL
Qty: 0 | Refills: 0 | COMMUNITY

## 2018-04-16 RX ORDER — FAMOTIDINE 10 MG/ML
0 INJECTION INTRAVENOUS
Qty: 0 | Refills: 0 | COMMUNITY

## 2018-04-16 RX ORDER — CELECOXIB 200 MG/1
200 CAPSULE ORAL ONCE
Qty: 0 | Refills: 0 | Status: COMPLETED | OUTPATIENT
Start: 2018-04-16 | End: 2018-04-16

## 2018-04-16 RX ORDER — LIDOCAINE HCL 20 MG/ML
0.2 VIAL (ML) INJECTION ONCE
Qty: 0 | Refills: 0 | Status: COMPLETED | OUTPATIENT
Start: 2018-04-16 | End: 2018-04-16

## 2018-04-16 RX ADMIN — CELECOXIB 200 MILLIGRAM(S): 200 CAPSULE ORAL at 13:00

## 2018-04-16 RX ADMIN — Medication 1000 MILLIGRAM(S): at 10:41

## 2018-04-16 RX ADMIN — CELECOXIB 200 MILLIGRAM(S): 200 CAPSULE ORAL at 10:41

## 2018-04-16 NOTE — ASU DISCHARGE PLAN (ADULT/PEDIATRIC). - MEDICATION SUMMARY - MEDICATIONS TO STOP TAKING
I will STOP taking the medications listed below when I get home from the hospital:    Pepcid 20 mg oral tablet  -- 2 doses preop

## 2018-04-16 NOTE — ASU PATIENT PROFILE, ADULT - PSH
Delivery with history of     H/O ovarian cystectomy    Left breast mass  surgical excision, benign  S/P laparoscopic hysterectomy  Bilateral Salpingectomy, Lysis of Adhesions,   Status post hysteroscopy  uterine polypectomy,   Status post tonsillectomy  age 19

## 2018-04-16 NOTE — ASU DISCHARGE PLAN (ADULT/PEDIATRIC). - MEDICATION SUMMARY - MEDICATIONS TO TAKE
I will START or STAY ON the medications listed below when I get home from the hospital:    oxyCODONE-acetaminophen 5 mg-325 mg oral tablet  -- 1-2 tab(s) by mouth every 4 hours, As Needed MDD:12 tabs  -- Caution federal law prohibits the transfer of this drug to any person other  than the person for whom it was prescribed.  May cause drowsiness.  Alcohol may intensify this effect.  Use care when operating dangerous machinery.  This prescription cannot be refilled.  This product contains acetaminophen.  Do not use  with any other product containing acetaminophen to prevent possible liver damage.  Using more of this medication than prescribed may cause serious breathing problems.    -- Indication: For pain    Vitamin D3 1000 intl units oral capsule  -- 1 cap(s) by mouth once a day  -- Indication: For home med

## 2018-04-16 NOTE — ASU PATIENT PROFILE, ADULT - PMH
Hemorrhoids    Multiple sclerosis  relapsing/remitting - recent exacerbation 12/2017 required hospitalization & steroid treatment  Ovarian cyst    Pain in both lower extremities  with intermittent weakness  Patient is [employer]    Uterine leiomyoma

## 2018-04-18 LAB — SURGICAL PATHOLOGY STUDY: SIGNIFICANT CHANGE UP

## 2018-04-21 ENCOUNTER — APPOINTMENT (OUTPATIENT)
Dept: MRI IMAGING | Facility: IMAGING CENTER | Age: 43
End: 2018-04-21

## 2018-05-07 ENCOUNTER — APPOINTMENT (OUTPATIENT)
Dept: SURGERY | Facility: CLINIC | Age: 43
End: 2018-05-07
Payer: MEDICAID

## 2018-05-07 VITALS
OXYGEN SATURATION: 100 % | BODY MASS INDEX: 24.8 KG/M2 | SYSTOLIC BLOOD PRESSURE: 120 MMHG | WEIGHT: 123 LBS | DIASTOLIC BLOOD PRESSURE: 81 MMHG | TEMPERATURE: 98 F | RESPIRATION RATE: 16 BRPM | HEART RATE: 91 BPM | HEIGHT: 59 IN

## 2018-05-07 DIAGNOSIS — K64.4 RESIDUAL HEMORRHOIDAL SKIN TAGS: ICD-10-CM

## 2018-05-07 PROCEDURE — 99024 POSTOP FOLLOW-UP VISIT: CPT

## 2018-06-20 ENCOUNTER — APPOINTMENT (OUTPATIENT)
Dept: SURGERY | Facility: CLINIC | Age: 43
End: 2018-06-20
Payer: MEDICAID

## 2018-06-20 VITALS
SYSTOLIC BLOOD PRESSURE: 128 MMHG | TEMPERATURE: 98.1 F | DIASTOLIC BLOOD PRESSURE: 92 MMHG | HEART RATE: 75 BPM | WEIGHT: 123 LBS | RESPIRATION RATE: 15 BRPM | HEIGHT: 59 IN | BODY MASS INDEX: 24.8 KG/M2 | OXYGEN SATURATION: 100 %

## 2018-06-20 PROCEDURE — 99024 POSTOP FOLLOW-UP VISIT: CPT

## 2018-06-20 RX ORDER — DIMETHYL FUMARATE 120 MG/1
120 CAPSULE ORAL
Refills: 0 | Status: DISCONTINUED | COMMUNITY
End: 2018-06-20

## 2018-06-28 ENCOUNTER — OUTPATIENT (OUTPATIENT)
Dept: OUTPATIENT SERVICES | Facility: HOSPITAL | Age: 43
LOS: 1 days | End: 2018-06-28
Payer: MEDICAID

## 2018-06-28 ENCOUNTER — APPOINTMENT (OUTPATIENT)
Dept: SURGERY | Facility: HOSPITAL | Age: 43
End: 2018-06-28
Payer: MEDICAID

## 2018-06-28 ENCOUNTER — RESULT REVIEW (OUTPATIENT)
Age: 43
End: 2018-06-28

## 2018-06-28 DIAGNOSIS — Z98.89 OTHER SPECIFIED POSTPROCEDURAL STATES: Chronic | ICD-10-CM

## 2018-06-28 DIAGNOSIS — N63 UNSPECIFIED LUMP IN BREAST: Chronic | ICD-10-CM

## 2018-06-28 DIAGNOSIS — K62.4 STENOSIS OF ANUS AND RECTUM: ICD-10-CM

## 2018-06-28 DIAGNOSIS — Z90.710 ACQUIRED ABSENCE OF BOTH CERVIX AND UTERUS: Chronic | ICD-10-CM

## 2018-06-28 DIAGNOSIS — O34.21 MATERNAL CARE FOR SCAR FROM PREVIOUS CESAREAN DELIVERY: Chronic | ICD-10-CM

## 2018-06-28 PROCEDURE — 88305 TISSUE EXAM BY PATHOLOGIST: CPT

## 2018-06-28 PROCEDURE — 45331 SIGMOIDOSCOPY AND BIOPSY: CPT

## 2018-06-28 PROCEDURE — 45330 DIAGNOSTIC SIGMOIDOSCOPY: CPT | Mod: 58

## 2018-06-28 PROCEDURE — 88305 TISSUE EXAM BY PATHOLOGIST: CPT | Mod: 26

## 2018-06-29 LAB — SURGICAL PATHOLOGY STUDY: SIGNIFICANT CHANGE UP

## 2018-07-16 ENCOUNTER — APPOINTMENT (OUTPATIENT)
Dept: SURGERY | Facility: CLINIC | Age: 43
End: 2018-07-16
Payer: MEDICAID

## 2018-07-16 VITALS
HEART RATE: 57 BPM | OXYGEN SATURATION: 97 % | TEMPERATURE: 98.6 F | SYSTOLIC BLOOD PRESSURE: 112 MMHG | DIASTOLIC BLOOD PRESSURE: 78 MMHG | RESPIRATION RATE: 15 BRPM

## 2018-07-16 PROCEDURE — 99213 OFFICE O/P EST LOW 20 MIN: CPT

## 2018-07-16 RX ORDER — OXYCODONE AND ACETAMINOPHEN 5; 325 MG/1; MG/1
5-325 TABLET ORAL
Qty: 30 | Refills: 0 | Status: DISCONTINUED | COMMUNITY
Start: 2018-06-28 | End: 2018-07-16

## 2018-08-08 ENCOUNTER — APPOINTMENT (OUTPATIENT)
Dept: SURGERY | Facility: CLINIC | Age: 43
End: 2018-08-08
Payer: MEDICAID

## 2018-08-08 VITALS
SYSTOLIC BLOOD PRESSURE: 128 MMHG | HEART RATE: 71 BPM | BODY MASS INDEX: 24.8 KG/M2 | HEIGHT: 59 IN | TEMPERATURE: 98 F | RESPIRATION RATE: 16 BRPM | WEIGHT: 123 LBS | OXYGEN SATURATION: 98 % | DIASTOLIC BLOOD PRESSURE: 84 MMHG

## 2018-08-08 PROBLEM — M79.604 PAIN IN RIGHT LEG: Chronic | Status: ACTIVE | Noted: 2018-04-10

## 2018-08-08 PROBLEM — K64.9 UNSPECIFIED HEMORRHOIDS: Chronic | Status: ACTIVE | Noted: 2018-04-10

## 2018-08-08 PROCEDURE — 99213 OFFICE O/P EST LOW 20 MIN: CPT

## 2018-08-29 ENCOUNTER — APPOINTMENT (OUTPATIENT)
Dept: SURGERY | Facility: CLINIC | Age: 43
End: 2018-08-29
Payer: MEDICAID

## 2018-08-29 VITALS
HEIGHT: 59 IN | BODY MASS INDEX: 24.8 KG/M2 | HEART RATE: 83 BPM | TEMPERATURE: 98.2 F | DIASTOLIC BLOOD PRESSURE: 75 MMHG | WEIGHT: 123 LBS | OXYGEN SATURATION: 99 % | RESPIRATION RATE: 16 BRPM | SYSTOLIC BLOOD PRESSURE: 129 MMHG

## 2018-08-29 PROCEDURE — 99213 OFFICE O/P EST LOW 20 MIN: CPT

## 2018-09-12 NOTE — ED ADULT NURSE NOTE - CAS TRG GEN SKIN CONDITION
Evonne Valencia is a 63 year old female presenting with back pain and migraine.    Denies Latex allergy or sensitivity.     There were no vitals taken for this visit.    No changes to Patient's medical history or social history since their last visit.    ALLERGIES:   Allergen Reactions   • Ceclor [Cefaclor] HIVES and SWELLING     Shed 4 layers of skin and extremely SOB   • Cephalosporins HIVES and SWELLING   • Hctz [Hydrochlorothiazide] RASH   • Lexapro      Put pt into kidney failure   • Amoxicillin RASH   • Ambien Other (See Comments)     confusion   • Calan [Verapamil Hcl] DIZZINESS   • Ciprofloxacin RASH     Tolerated Levaquin without difficulty.   • Clindamycin Hcl RASH   • Diltiazem Other (See Comments)     Leg edema and leg rash.   • Edecrin [Ethacrynate Sodium]      Non effective, wt gain    • Erythromycin Base NAUSEA   • Imitrex [Sumatriptan Base]      Chest pain   • Mobic      Kidney side effects   • Reglan Other (See Comments)     confusion   • Sulfa Antibiotics HIVES   • Topamax      Muscle jerks and memory loss per pt   • Vancomycin      Rash    • Vicodin [Hydrocodone-Acetaminophen] VOMITING   • Zolpimist      Automatic behaviour in sleep.       Patient notes that she currently is not a smoker.     Dry/Warm

## 2018-10-01 ENCOUNTER — APPOINTMENT (OUTPATIENT)
Dept: SURGERY | Facility: CLINIC | Age: 43
End: 2018-10-01
Payer: MEDICAID

## 2018-10-01 VITALS
HEIGHT: 59 IN | WEIGHT: 123 LBS | SYSTOLIC BLOOD PRESSURE: 138 MMHG | BODY MASS INDEX: 24.8 KG/M2 | DIASTOLIC BLOOD PRESSURE: 87 MMHG | OXYGEN SATURATION: 100 % | RESPIRATION RATE: 16 BRPM | HEART RATE: 87 BPM | TEMPERATURE: 98.1 F

## 2018-10-01 PROCEDURE — 99213 OFFICE O/P EST LOW 20 MIN: CPT | Mod: 25

## 2018-10-01 PROCEDURE — 45905 DILATION OF ANAL SPHINCTER: CPT | Mod: 52

## 2018-10-24 ENCOUNTER — APPOINTMENT (OUTPATIENT)
Dept: SURGERY | Facility: CLINIC | Age: 43
End: 2018-10-24
Payer: MEDICAID

## 2018-10-24 VITALS
HEIGHT: 59 IN | OXYGEN SATURATION: 100 % | DIASTOLIC BLOOD PRESSURE: 86 MMHG | SYSTOLIC BLOOD PRESSURE: 125 MMHG | WEIGHT: 123 LBS | RESPIRATION RATE: 15 BRPM | BODY MASS INDEX: 24.8 KG/M2 | HEART RATE: 69 BPM

## 2018-10-24 DIAGNOSIS — Z98.890 OTHER SPECIFIED POSTPROCEDURAL STATES: ICD-10-CM

## 2018-10-24 PROCEDURE — 99212 OFFICE O/P EST SF 10 MIN: CPT

## 2018-11-10 NOTE — OCCUPATIONAL THERAPY INITIAL EVALUATION ADULT - ADAPTIVE EQUIPMENT NEEDED
no
radiology results/lab results/return to ED if symptoms worsen, persist or questions arise/need for outpatient follow-up

## 2018-11-19 ENCOUNTER — APPOINTMENT (OUTPATIENT)
Dept: DERMATOLOGY | Facility: CLINIC | Age: 43
End: 2018-11-19
Payer: MEDICAID

## 2018-11-19 VITALS — WEIGHT: 125 LBS | BODY MASS INDEX: 25.25 KG/M2 | DIASTOLIC BLOOD PRESSURE: 80 MMHG | SYSTOLIC BLOOD PRESSURE: 110 MMHG

## 2018-11-19 DIAGNOSIS — R61 GENERALIZED HYPERHIDROSIS: ICD-10-CM

## 2018-11-19 DIAGNOSIS — Z91.89 OTHER SPECIFIED PERSONAL RISK FACTORS, NOT ELSEWHERE CLASSIFIED: ICD-10-CM

## 2018-11-19 PROCEDURE — 99202 OFFICE O/P NEW SF 15 MIN: CPT

## 2018-11-28 ENCOUNTER — APPOINTMENT (OUTPATIENT)
Dept: SURGERY | Facility: CLINIC | Age: 43
End: 2018-11-28
Payer: MEDICAID

## 2018-11-28 VITALS
TEMPERATURE: 98.5 F | OXYGEN SATURATION: 99 % | HEART RATE: 88 BPM | HEIGHT: 59 IN | DIASTOLIC BLOOD PRESSURE: 76 MMHG | WEIGHT: 125 LBS | SYSTOLIC BLOOD PRESSURE: 128 MMHG | RESPIRATION RATE: 15 BRPM | BODY MASS INDEX: 25.2 KG/M2

## 2018-11-28 PROCEDURE — 46600 DIAGNOSTIC ANOSCOPY SPX: CPT

## 2018-11-28 PROCEDURE — 99212 OFFICE O/P EST SF 10 MIN: CPT | Mod: 25

## 2018-11-29 ENCOUNTER — APPOINTMENT (OUTPATIENT)
Dept: MRI IMAGING | Facility: CLINIC | Age: 43
End: 2018-11-29
Payer: MEDICAID

## 2018-11-29 ENCOUNTER — OUTPATIENT (OUTPATIENT)
Dept: OUTPATIENT SERVICES | Facility: HOSPITAL | Age: 43
LOS: 1 days | End: 2018-11-29
Payer: MEDICAID

## 2018-11-29 DIAGNOSIS — Z98.89 OTHER SPECIFIED POSTPROCEDURAL STATES: Chronic | ICD-10-CM

## 2018-11-29 DIAGNOSIS — O34.21 MATERNAL CARE FOR SCAR FROM PREVIOUS CESAREAN DELIVERY: Chronic | ICD-10-CM

## 2018-11-29 DIAGNOSIS — N63 UNSPECIFIED LUMP IN BREAST: Chronic | ICD-10-CM

## 2018-11-29 DIAGNOSIS — Z90.710 ACQUIRED ABSENCE OF BOTH CERVIX AND UTERUS: Chronic | ICD-10-CM

## 2018-11-29 DIAGNOSIS — Z00.8 ENCOUNTER FOR OTHER GENERAL EXAMINATION: ICD-10-CM

## 2018-11-29 DIAGNOSIS — G35 MULTIPLE SCLEROSIS: ICD-10-CM

## 2018-11-29 PROCEDURE — 72157 MRI CHEST SPINE W/O & W/DYE: CPT

## 2018-11-29 PROCEDURE — 70553 MRI BRAIN STEM W/O & W/DYE: CPT | Mod: 26

## 2018-11-29 PROCEDURE — 72157 MRI CHEST SPINE W/O & W/DYE: CPT | Mod: 26

## 2018-11-29 PROCEDURE — 72156 MRI NECK SPINE W/O & W/DYE: CPT | Mod: 26

## 2018-11-29 PROCEDURE — 72156 MRI NECK SPINE W/O & W/DYE: CPT

## 2018-11-29 PROCEDURE — A9585: CPT

## 2018-11-29 PROCEDURE — 70553 MRI BRAIN STEM W/O & W/DYE: CPT

## 2019-01-01 NOTE — PATIENT PROFILE ADULT. - NSSUBSTANCEUSE_GEN_ALL_CORE_SD
Patient:   JESSIE PERRY            MRN: [id number]            FIN: [id number]              Age:   22 hours     Sex:  MALE     :  20   Associated Diagnoses:   [place]; Syndrome of infant of diabetic mother  Author:   LASHONDA BENÍTEZ     Basic Information   Admitted from:  Labor and delivery.    Present at bedside:  Mother, Father.      Review of Systems   Not applicable:  Patient is .      Health Status   Allergies:    Allergic Reactions (All)  NKA   Current medications: None     Histories     Maternal History   Include maternal history : OB DOCUMENTATION FLOWSHEET   19 03:54 CDT Maternal Risk Factors in Utero Diabetes, Gestational, Non-Insulin Dependent   Feeding Preference Formula    Maternal  4    Maternal Para 2    Maternal Induction Reason Diabetes, Gestational    Maternal HBsAg Transcribed Negative    Maternal RPR Transcribed Non Reactive    Maternal Rubella Transcribed Immune    Maternal HIV Transcribed Negative    Maternal HIV Transcribed 2 Negative    Maternal Group B Strep Transcribed Negative      information     Full term.     Normal vaginal delivery.       Physical Examination   VS/Measurements   Measurements from flowsheet : Height and Weight   19 20:00 CDT CLINICALWEIGHT 3.635 kg   19 12:02 CDT CLINICALWEIGHT 3.620 kg    Weight Method Measured    MEDDOSEWT 3.620 kg    CLINICALHEIGHT 48.3 cm    Height Method Measured    Weight Scale Bed    BSA - Medical History 0.21    BMI-Medical History 15.5 kg/m2       General:  In open crib.    Eye:       Red reflex: Bilaterally, Present, Symmetrical.    HENT:  Normocephalic, Nares patent.    Neck:  Supple, No lymphadenopathy.    Respiratory:  Lungs are clear to auscultation, Respirations are non-labored.   Cardiovascular:  Normal rate, Regular rhythm, No murmur, Good pulses equal in all extremities.   Gastrointestinal:  Soft, Non-tender, Non-distended, Normal bowel sounds, 3 vessel umbilical cord.    Genitourinary:  Normal genitalia for age and sex.    Musculoskeletal     Normal range of motion.     Normal strength.     No hip clicks.     Integumentary:  Warm, Dry, Pink.    Neurologic:  Alert.      Impression and Plan   Diagnosis     [place] (PUX16-SI Z00.1, Diagnosis, Hospitalized, Medical).     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    Condition:  Stable.    Plan     Formula feeding: house formula, 1) Gestational Diabetes - sugar protocol, thusfar all normal levels. Infant formula feeding well  2) May go home today, I informed parents that infant would need to be seen tomorrow. They are undecided currently.   never used

## 2019-01-18 RX ORDER — ALUMINUM CHLORIDE 20 %
20 SOLUTION, NON-ORAL TOPICAL
Qty: 1 | Refills: 3 | Status: COMPLETED | COMMUNITY
Start: 2018-11-19 | End: 2019-01-18

## 2019-04-10 ENCOUNTER — APPOINTMENT (OUTPATIENT)
Dept: SURGERY | Facility: CLINIC | Age: 44
End: 2019-04-10
Payer: MEDICAID

## 2019-04-10 VITALS
HEIGHT: 59 IN | TEMPERATURE: 98.5 F | DIASTOLIC BLOOD PRESSURE: 84 MMHG | HEART RATE: 76 BPM | BODY MASS INDEX: 25.2 KG/M2 | RESPIRATION RATE: 14 BRPM | WEIGHT: 125 LBS | SYSTOLIC BLOOD PRESSURE: 128 MMHG | OXYGEN SATURATION: 100 %

## 2019-04-10 PROCEDURE — 46600 DIAGNOSTIC ANOSCOPY SPX: CPT

## 2019-04-10 PROCEDURE — 99212 OFFICE O/P EST SF 10 MIN: CPT | Mod: 25

## 2019-04-26 NOTE — ASSESSMENT
[FreeTextEntry1] : Pt with anal stricture that is improving on exam and with no anorectal symptoms at this time. \par Discussed her chronic constipation, advised her to see GI for consideration for Linzess, she will think about it.  \par Pt to continue anal dilatation at home.\par RTO in few months for f/u exam. \par

## 2019-04-26 NOTE — H&P PST ADULT - NS HIV RISK FACTOR NO
acute on chronic back pain acute on chronic back pain acute on chronic back pain acute on chronic back pain acute on chronic back pain acute on chronic back pain acute on chronic back pain acute on chronic back pain No, Declined

## 2019-04-26 NOTE — HISTORY OF PRESENT ILLNESS
[FreeTextEntry1] : 42 y/o female here for a follow-up visit. H/o severe constipation and extensive hemorrhoidal disease - she underwent four quadrant excisional hemorrhoidectomy on 04/16/18, developed postop an anal stricture and on 06/28/18 I performed anal dilatation under EUA. The last visit the plan was to continue with home anal dilations. \par Today, she reports that she had stopped with fiber, herbal supplement she had taken in the past. She resumed Miralax (1 cupful) every other day. She is not straining. She denies anorectal pain. She has been using the #15 dilator every now and then; reports she has been traversing the stricture with minimal discomfort. She uses salt water flushes ( a type of oral cathartic) about every week. \par

## 2019-05-02 ENCOUNTER — APPOINTMENT (OUTPATIENT)
Dept: MRI IMAGING | Facility: IMAGING CENTER | Age: 44
End: 2019-05-02
Payer: MEDICAID

## 2019-05-02 ENCOUNTER — OUTPATIENT (OUTPATIENT)
Dept: OUTPATIENT SERVICES | Facility: HOSPITAL | Age: 44
LOS: 1 days | End: 2019-05-02
Payer: MEDICAID

## 2019-05-02 DIAGNOSIS — Z98.89 OTHER SPECIFIED POSTPROCEDURAL STATES: Chronic | ICD-10-CM

## 2019-05-02 DIAGNOSIS — N63 UNSPECIFIED LUMP IN BREAST: Chronic | ICD-10-CM

## 2019-05-02 DIAGNOSIS — O34.21 MATERNAL CARE FOR SCAR FROM PREVIOUS CESAREAN DELIVERY: Chronic | ICD-10-CM

## 2019-05-02 DIAGNOSIS — Z90.710 ACQUIRED ABSENCE OF BOTH CERVIX AND UTERUS: Chronic | ICD-10-CM

## 2019-05-02 DIAGNOSIS — Z00.8 ENCOUNTER FOR OTHER GENERAL EXAMINATION: ICD-10-CM

## 2019-05-02 PROCEDURE — 70551 MRI BRAIN STEM W/O DYE: CPT

## 2019-05-02 PROCEDURE — 70551 MRI BRAIN STEM W/O DYE: CPT | Mod: 26

## 2019-05-02 PROCEDURE — 72141 MRI NECK SPINE W/O DYE: CPT | Mod: 26

## 2019-05-02 PROCEDURE — 72146 MRI CHEST SPINE W/O DYE: CPT | Mod: 26

## 2019-05-02 PROCEDURE — 72146 MRI CHEST SPINE W/O DYE: CPT

## 2019-05-02 PROCEDURE — 72141 MRI NECK SPINE W/O DYE: CPT

## 2019-05-08 ENCOUNTER — APPOINTMENT (OUTPATIENT)
Dept: DERMATOLOGY | Facility: CLINIC | Age: 44
End: 2019-05-08
Payer: MEDICAID

## 2019-05-08 DIAGNOSIS — L82.1 OTHER SEBORRHEIC KERATOSIS: ICD-10-CM

## 2019-05-08 PROCEDURE — 99213 OFFICE O/P EST LOW 20 MIN: CPT

## 2019-06-01 NOTE — ED PROVIDER NOTE - NS ED NOTE AC HIGH RISK COUNTRIES
51 year old male with HTN and DM2, who presented with Perforated Antral Gastric Ulcer, POD 5 s/p Emergent Exp-lap Oomentopexy and plication. He had an RRT for atrial fibrillation with rapid ventricular response, which is new. He has had difficult to control periods of tachycardia and bradycardia, currently in a sinus rhythm this morning   He had an elevated troponin possibly suggestive of non-ST segment elevation myocardial infarction/demand ischemia. He has no known history of coronary artery disease    Afib  - Remains in SR clinically.  No further evidence of Afib after initial episode while on tele  - Continue Amio, plan is to stop Amio as an outpatient and consider BB once off  - TTE with normal LV function, moderate MR   - No need for long term AC as Afib was short lived   - Maintain  K>4, Mg>2  - If no recurrence of Afib while inpatient, we recommend an event monitor as outpatient.    Anemia  - H&H noted  - As per primary team  - Off full dose AC    S/P omentopexy and plication of gastric ulcer   - Tolerated procedure well, however, had Afib postop, spontaneously to NSR  - Pain control    Left foot OM  - MRI with left foot osteomyelitis and now S/P  left hallux resection with debridement tolerated procedure w/o cardiac compromise   - ID following.  Planned for long term Abx    - All other workup per Primary. Will continue to follow     Henrietta Kim DNP  Cardiology No

## 2019-07-28 ENCOUNTER — OUTPATIENT (OUTPATIENT)
Dept: OUTPATIENT SERVICES | Facility: HOSPITAL | Age: 44
LOS: 1 days | End: 2019-07-28
Payer: MEDICAID

## 2019-07-28 ENCOUNTER — APPOINTMENT (OUTPATIENT)
Dept: MRI IMAGING | Facility: CLINIC | Age: 44
End: 2019-07-28
Payer: MEDICAID

## 2019-07-28 DIAGNOSIS — N63 UNSPECIFIED LUMP IN BREAST: Chronic | ICD-10-CM

## 2019-07-28 DIAGNOSIS — Z00.8 ENCOUNTER FOR OTHER GENERAL EXAMINATION: ICD-10-CM

## 2019-07-28 DIAGNOSIS — Z98.89 OTHER SPECIFIED POSTPROCEDURAL STATES: Chronic | ICD-10-CM

## 2019-07-28 DIAGNOSIS — Z90.710 ACQUIRED ABSENCE OF BOTH CERVIX AND UTERUS: Chronic | ICD-10-CM

## 2019-07-28 DIAGNOSIS — O34.21 MATERNAL CARE FOR SCAR FROM PREVIOUS CESAREAN DELIVERY: Chronic | ICD-10-CM

## 2019-07-28 PROCEDURE — 72157 MRI CHEST SPINE W/O & W/DYE: CPT | Mod: 26

## 2019-07-28 PROCEDURE — 72157 MRI CHEST SPINE W/O & W/DYE: CPT

## 2019-10-20 ENCOUNTER — OUTPATIENT (OUTPATIENT)
Dept: OUTPATIENT SERVICES | Facility: HOSPITAL | Age: 44
LOS: 1 days | End: 2019-10-20
Payer: MEDICAID

## 2019-10-20 ENCOUNTER — APPOINTMENT (OUTPATIENT)
Dept: MRI IMAGING | Facility: CLINIC | Age: 44
End: 2019-10-20
Payer: MEDICAID

## 2019-10-20 DIAGNOSIS — N63 UNSPECIFIED LUMP IN BREAST: Chronic | ICD-10-CM

## 2019-10-20 DIAGNOSIS — Z98.89 OTHER SPECIFIED POSTPROCEDURAL STATES: Chronic | ICD-10-CM

## 2019-10-20 DIAGNOSIS — O34.21 MATERNAL CARE FOR SCAR FROM PREVIOUS CESAREAN DELIVERY: Chronic | ICD-10-CM

## 2019-10-20 DIAGNOSIS — Z00.8 ENCOUNTER FOR OTHER GENERAL EXAMINATION: ICD-10-CM

## 2019-10-20 DIAGNOSIS — Z90.710 ACQUIRED ABSENCE OF BOTH CERVIX AND UTERUS: Chronic | ICD-10-CM

## 2019-10-20 PROCEDURE — 72148 MRI LUMBAR SPINE W/O DYE: CPT | Mod: 26

## 2019-10-20 PROCEDURE — 72148 MRI LUMBAR SPINE W/O DYE: CPT

## 2019-11-05 ENCOUNTER — APPOINTMENT (OUTPATIENT)
Dept: DERMATOLOGY | Facility: CLINIC | Age: 44
End: 2019-11-05
Payer: MEDICAID

## 2019-11-05 VITALS — HEIGHT: 59 IN | BODY MASS INDEX: 25.2 KG/M2 | WEIGHT: 125 LBS

## 2019-11-05 DIAGNOSIS — L72.0 EPIDERMAL CYST: ICD-10-CM

## 2019-11-05 DIAGNOSIS — L82.1 OTHER SEBORRHEIC KERATOSIS: ICD-10-CM

## 2019-11-05 PROCEDURE — 99213 OFFICE O/P EST LOW 20 MIN: CPT | Mod: GC

## 2019-12-16 ENCOUNTER — APPOINTMENT (OUTPATIENT)
Dept: SURGERY | Facility: CLINIC | Age: 44
End: 2019-12-16
Payer: MEDICAID

## 2019-12-16 VITALS
DIASTOLIC BLOOD PRESSURE: 80 MMHG | OXYGEN SATURATION: 100 % | TEMPERATURE: 98.5 F | RESPIRATION RATE: 16 BRPM | SYSTOLIC BLOOD PRESSURE: 122 MMHG | HEART RATE: 74 BPM

## 2019-12-16 PROCEDURE — 46600 DIAGNOSTIC ANOSCOPY SPX: CPT

## 2019-12-16 PROCEDURE — 99212 OFFICE O/P EST SF 10 MIN: CPT | Mod: 25

## 2019-12-17 NOTE — ASSESSMENT
[FreeTextEntry1] : Pt with h/o anal stricture, currently on exam there is scar tissue that is not clinically significant.  \par Discussed her chronic constipation, advised her again to see GI for consideration for Linzess.  \par Pt to continue anal dilatation at home and to use #20 dilator.\par RTO in 6 months for f/u exam. \par

## 2019-12-17 NOTE — HISTORY OF PRESENT ILLNESS
[FreeTextEntry1] : 43 yo female with recent episode of anorectal pain and prolapsing tissue after an episode of fecal impaction. She has h/o severe constipation; she underwent four quadrant excisional hemorrhoidectomy on 04/16/18, developed postop an anal stricture and on 06/28/18 I performed anal dilatation under EUA. Her stricture has dilated well and she has been doing home anal dilations. She was last seen on 04/10/2019, and she was advised to continue anal dilatation at home and to f/u with GI for hx of chronic constipation.\par Today, she reports occasional use of laxatives, was using Dulcolax once a week up to one month ago, then switched to Miralax, then she stopped it as well 2 weeks ago. She denies anorectal pain. She has been using the #15 dilator every now and then. She uses salt water flushes ( a type of oral cathartic) about every week. \par She reports that last week Wednesday, she had an episode of impaction with a painful hard BM and a lot of straining. She had anorectal pain and prolapsing tissue. She also noticed some blood in the toilet. Today, she denies pain, rectal bleeding, abdominal discomfort. She has not had a BM since Wednesday.\par \par  \par \par

## 2019-12-17 NOTE — PHYSICAL EXAM
[FreeTextEntry1] : No external hemorrhoids, fissures or prolapsing tissue. PETRA admits one finger easily; there is palpable scar tissue, but it is not causing a stenosis.  \par Anoscopy with the medium anoscope passes the stricture with minimal discomfort. The mucosa is pink and there are no large hemorrhoids.

## 2020-04-25 ENCOUNTER — APPOINTMENT (OUTPATIENT)
Dept: MRI IMAGING | Facility: IMAGING CENTER | Age: 45
End: 2020-04-25
Payer: MEDICAID

## 2020-04-25 ENCOUNTER — OUTPATIENT (OUTPATIENT)
Dept: OUTPATIENT SERVICES | Facility: HOSPITAL | Age: 45
LOS: 1 days | End: 2020-04-25
Payer: MEDICAID

## 2020-04-25 DIAGNOSIS — Z98.89 OTHER SPECIFIED POSTPROCEDURAL STATES: Chronic | ICD-10-CM

## 2020-04-25 DIAGNOSIS — N63 UNSPECIFIED LUMP IN BREAST: Chronic | ICD-10-CM

## 2020-04-25 DIAGNOSIS — O34.21 MATERNAL CARE FOR SCAR FROM PREVIOUS CESAREAN DELIVERY: Chronic | ICD-10-CM

## 2020-04-25 DIAGNOSIS — Z90.710 ACQUIRED ABSENCE OF BOTH CERVIX AND UTERUS: Chronic | ICD-10-CM

## 2020-04-25 DIAGNOSIS — Z00.8 ENCOUNTER FOR OTHER GENERAL EXAMINATION: ICD-10-CM

## 2020-04-25 PROCEDURE — 70553 MRI BRAIN STEM W/O & W/DYE: CPT

## 2020-04-25 PROCEDURE — 70553 MRI BRAIN STEM W/O & W/DYE: CPT | Mod: 26

## 2020-04-25 PROCEDURE — A9585: CPT

## 2020-05-11 ENCOUNTER — APPOINTMENT (OUTPATIENT)
Dept: MRI IMAGING | Facility: CLINIC | Age: 45
End: 2020-05-11
Payer: MEDICAID

## 2020-05-11 ENCOUNTER — OUTPATIENT (OUTPATIENT)
Dept: OUTPATIENT SERVICES | Facility: HOSPITAL | Age: 45
LOS: 1 days | End: 2020-05-11
Payer: MEDICAID

## 2020-05-11 DIAGNOSIS — N63 UNSPECIFIED LUMP IN BREAST: Chronic | ICD-10-CM

## 2020-05-11 DIAGNOSIS — O34.21 MATERNAL CARE FOR SCAR FROM PREVIOUS CESAREAN DELIVERY: Chronic | ICD-10-CM

## 2020-05-11 DIAGNOSIS — Z98.89 OTHER SPECIFIED POSTPROCEDURAL STATES: Chronic | ICD-10-CM

## 2020-05-11 DIAGNOSIS — Z90.710 ACQUIRED ABSENCE OF BOTH CERVIX AND UTERUS: Chronic | ICD-10-CM

## 2020-05-11 DIAGNOSIS — G95.9 DISEASE OF SPINAL CORD, UNSPECIFIED: ICD-10-CM

## 2020-05-11 PROCEDURE — 72156 MRI NECK SPINE W/O & W/DYE: CPT | Mod: 26

## 2020-05-11 PROCEDURE — 72157 MRI CHEST SPINE W/O & W/DYE: CPT | Mod: 26

## 2020-05-11 PROCEDURE — A9585: CPT

## 2020-05-11 PROCEDURE — 72156 MRI NECK SPINE W/O & W/DYE: CPT

## 2020-05-11 PROCEDURE — 72157 MRI CHEST SPINE W/O & W/DYE: CPT

## 2020-06-16 ENCOUNTER — APPOINTMENT (OUTPATIENT)
Dept: ELECTROPHYSIOLOGY | Facility: CLINIC | Age: 45
End: 2020-06-16
Payer: MEDICAID

## 2020-06-16 ENCOUNTER — NON-APPOINTMENT (OUTPATIENT)
Age: 45
End: 2020-06-16

## 2020-06-16 ENCOUNTER — APPOINTMENT (OUTPATIENT)
Dept: CARDIOLOGY | Facility: CLINIC | Age: 45
End: 2020-06-16
Payer: MEDICAID

## 2020-06-16 VITALS
BODY MASS INDEX: 25 KG/M2 | HEART RATE: 84 BPM | HEIGHT: 59 IN | SYSTOLIC BLOOD PRESSURE: 131 MMHG | WEIGHT: 124 LBS | DIASTOLIC BLOOD PRESSURE: 92 MMHG | OXYGEN SATURATION: 100 %

## 2020-06-16 DIAGNOSIS — F41.9 ANXIETY DISORDER, UNSPECIFIED: ICD-10-CM

## 2020-06-16 PROCEDURE — 99214 OFFICE O/P EST MOD 30 MIN: CPT

## 2020-06-16 PROCEDURE — 93000 ELECTROCARDIOGRAM COMPLETE: CPT

## 2020-06-16 RX ORDER — DIMETHYL FUMARATE 120 MG/1
120 CAPSULE ORAL DAILY
Refills: 0 | Status: ACTIVE | COMMUNITY
Start: 2020-06-16

## 2020-06-16 RX ORDER — TRETINOIN 0.25 MG/G
0.03 CREAM TOPICAL
Qty: 1 | Refills: 2 | Status: DISCONTINUED | COMMUNITY
Start: 2019-05-08 | End: 2020-06-16

## 2020-06-16 RX ORDER — IBUPROFEN 800 MG/1
800 TABLET, FILM COATED ORAL 3 TIMES DAILY
Refills: 0 | Status: ACTIVE | COMMUNITY
Start: 2020-06-16

## 2020-06-16 RX ORDER — KETOCONAZOLE 20.5 MG/ML
2 SHAMPOO, SUSPENSION TOPICAL
Qty: 1 | Refills: 10 | Status: DISCONTINUED | COMMUNITY
Start: 2018-11-19 | End: 2020-06-16

## 2020-06-16 RX ORDER — TRETINOIN 0.25 MG/G
0.03 CREAM TOPICAL
Qty: 1 | Refills: 4 | Status: DISCONTINUED | COMMUNITY
Start: 2019-11-05 | End: 2020-06-16

## 2020-06-16 NOTE — HISTORY OF PRESENT ILLNESS
[FreeTextEntry1] : Kadie is a 44-year-old female MS with palpitations. She saw it on her fitbit and HR 120bpm. Symptoms started on 6/6/20 almost daily. No lightheadedness, dizziness or shortness of breath.  thinks it is anxiety. No exercising because exacerbation of MS.

## 2020-06-16 NOTE — REVIEW OF SYSTEMS
[Dyspnea on exertion] : not dyspnea during exertion [Shortness Of Breath] : no shortness of breath [Chest Pain] : no chest pain [Lower Ext Edema] : no extremity edema [Palpitations] : palpitations [Negative] : Endocrine

## 2020-06-16 NOTE — PHYSICAL EXAM
[Well Groomed] : well groomed [General Appearance - Well Developed] : well developed [Normal Appearance] : normal appearance [General Appearance - Well Nourished] : well nourished [General Appearance - In No Acute Distress] : no acute distress [No Deformities] : no deformities [Eyelids - No Xanthelasma] : the eyelids demonstrated no xanthelasmas [Normal Oral Mucosa] : normal oral mucosa [Normal Conjunctiva] : the conjunctiva exhibited no abnormalities [No Oral Pallor] : no oral pallor [No Oral Cyanosis] : no oral cyanosis [Normal Jugular Venous A Waves Present] : normal jugular venous A waves present [Normal Jugular Venous V Waves Present] : normal jugular venous V waves present [No Jugular Venous Rothman A Waves] : no jugular venous rothman A waves [Heart Rate And Rhythm] : heart rate and rhythm were normal [Heart Sounds] : normal S1 and S2 [Murmurs] : no murmurs present [Respiration, Rhythm And Depth] : normal respiratory rhythm and effort [Exaggerated Use Of Accessory Muscles For Inspiration] : no accessory muscle use [Abdomen Soft] : soft [Bowel Sounds] : normal bowel sounds [Auscultation Breath Sounds / Voice Sounds] : lungs were clear to auscultation bilaterally [Abdomen Mass (___ Cm)] : no abdominal mass palpated [Abdomen Tenderness] : non-tender [Nail Clubbing] : no clubbing of the fingernails [Gait - Sufficient For Exercise Testing] : the gait was sufficient for exercise testing [Abnormal Walk] : normal gait [Petechial Hemorrhages (___cm)] : no petechial hemorrhages [Cyanosis, Localized] : no localized cyanosis [] : no rash [No Venous Stasis] : no venous stasis [Skin Color & Pigmentation] : normal skin color and pigmentation [No Skin Ulcers] : no skin ulcer [No Xanthoma] : no  xanthoma was observed [Skin Lesions] : no skin lesions [Oriented To Time, Place, And Person] : oriented to person, place, and time [Affect] : the affect was normal [Mood] : the mood was normal [No Anxiety] : not feeling anxious

## 2020-06-16 NOTE — DISCUSSION/SUMMARY
[FreeTextEntry1] : The patient is a 44-year-old female MS with palpitations over the last two weeks. Labs normal. ECG normal. Event monitor today for 4 days.

## 2020-06-29 PROCEDURE — 0296T: CPT

## 2020-06-29 PROCEDURE — 0298T: CPT

## 2020-07-31 NOTE — ED ADULT TRIAGE NOTE - CADM TRG TX PRIOR TO ARRIVAL
Subjective     No overnight events      • ClonazePAM  0.25 mg Oral Nightly   • nicotine  1 patch Transdermal Daily   • scopolamine  1 patch Transdermal Q72H   • escitalopram  10 mg Oral Daily   • docusate sodium  100 mg Oral BID   • dilTIAZem HCl  120 mg O none

## 2020-10-24 NOTE — ED PROVIDER NOTE - NS ED ATTENDING STATEMENT MOD
History of back surgery  (7/2015 and 1/2017)   I have personally seen and examined this patient. I have fully participated in the care of this patient. I have reviewed all pertinent clinical information, including history physical exam, plan and the Resident's note and agree except as noted

## 2020-10-31 ENCOUNTER — TRANSCRIPTION ENCOUNTER (OUTPATIENT)
Age: 45
End: 2020-10-31

## 2020-11-18 ENCOUNTER — NON-APPOINTMENT (OUTPATIENT)
Age: 45
End: 2020-11-18

## 2020-11-19 ENCOUNTER — NON-APPOINTMENT (OUTPATIENT)
Age: 45
End: 2020-11-19

## 2020-11-19 ENCOUNTER — APPOINTMENT (OUTPATIENT)
Dept: CARDIOLOGY | Facility: CLINIC | Age: 45
End: 2020-11-19
Payer: MEDICAID

## 2020-11-19 VITALS
DIASTOLIC BLOOD PRESSURE: 86 MMHG | HEART RATE: 70 BPM | BODY MASS INDEX: 24.8 KG/M2 | HEIGHT: 59 IN | SYSTOLIC BLOOD PRESSURE: 138 MMHG | WEIGHT: 123 LBS | OXYGEN SATURATION: 100 %

## 2020-11-19 DIAGNOSIS — I49.3 VENTRICULAR PREMATURE DEPOLARIZATION: ICD-10-CM

## 2020-11-19 PROCEDURE — 99214 OFFICE O/P EST MOD 30 MIN: CPT

## 2020-11-19 PROCEDURE — 93000 ELECTROCARDIOGRAM COMPLETE: CPT

## 2020-11-19 NOTE — PHYSICAL EXAM
[General Appearance - Well Developed] : well developed [Normal Appearance] : normal appearance [Well Groomed] : well groomed [General Appearance - Well Nourished] : well nourished [No Deformities] : no deformities [General Appearance - In No Acute Distress] : no acute distress [Normal Conjunctiva] : the conjunctiva exhibited no abnormalities [Eyelids - No Xanthelasma] : the eyelids demonstrated no xanthelasmas [Normal Oral Mucosa] : normal oral mucosa [No Oral Pallor] : no oral pallor [No Oral Cyanosis] : no oral cyanosis [Normal Jugular Venous A Waves Present] : normal jugular venous A waves present [Normal Jugular Venous V Waves Present] : normal jugular venous V waves present [No Jugular Venous Rothman A Waves] : no jugular venous rothman A waves [Respiration, Rhythm And Depth] : normal respiratory rhythm and effort [Exaggerated Use Of Accessory Muscles For Inspiration] : no accessory muscle use [Auscultation Breath Sounds / Voice Sounds] : lungs were clear to auscultation bilaterally [Heart Rate And Rhythm] : heart rate and rhythm were normal [Heart Sounds] : normal S1 and S2 [Murmurs] : no murmurs present [Bowel Sounds] : normal bowel sounds [Abdomen Soft] : soft [Abdomen Tenderness] : non-tender [Abdomen Mass (___ Cm)] : no abdominal mass palpated [Abnormal Walk] : normal gait [Gait - Sufficient For Exercise Testing] : the gait was sufficient for exercise testing [Nail Clubbing] : no clubbing of the fingernails [Cyanosis, Localized] : no localized cyanosis [Petechial Hemorrhages (___cm)] : no petechial hemorrhages [Skin Color & Pigmentation] : normal skin color and pigmentation [] : no rash [No Venous Stasis] : no venous stasis [Skin Lesions] : no skin lesions [No Skin Ulcers] : no skin ulcer [No Xanthoma] : no  xanthoma was observed [Oriented To Time, Place, And Person] : oriented to person, place, and time [Affect] : the affect was normal [Mood] : the mood was normal [No Anxiety] : not feeling anxious

## 2020-11-20 PROBLEM — I49.3 PREMATURE VENTRICULAR COMPLEX: Status: ACTIVE | Noted: 2020-11-19

## 2020-11-20 LAB
CHOLEST SERPL-MCNC: 286 MG/DL
CRP SERPL-MCNC: <0.1 MG/DL
HDLC SERPL-MCNC: 69 MG/DL
LDLC SERPL CALC-MCNC: 202 MG/DL
NONHDLC SERPL-MCNC: 217 MG/DL
TRIGL SERPL-MCNC: 77 MG/DL

## 2020-11-20 RX ORDER — ATORVASTATIN CALCIUM 20 MG/1
20 TABLET, FILM COATED ORAL
Qty: 90 | Refills: 1 | Status: ACTIVE | COMMUNITY
Start: 2020-11-20 | End: 1900-01-01

## 2020-11-20 NOTE — HISTORY OF PRESENT ILLNESS
[FreeTextEntry1] : Kadie is a 45-year-old female MS with palpitations and a discomfort in her chest. Symptoms had stopped but restarted over the last few weeks. No relation to position or exertion or meals. No lightheadedness, dizziness or shortness of breath.  thinks it is anxiety. No exercising because exacerbation of MS.

## 2020-11-20 NOTE — DISCUSSION/SUMMARY
[FreeTextEntry1] : The patient is a 45-year-old female MS with palpitations and chest pressure that have recurred. \par She is very nervous with recent family members who had stroke. Lipid panel and event monitor today. Stress management discussed.

## 2020-11-23 LAB — APO LP(A) SERPL-MCNC: 243.6 NMOL/L

## 2020-11-28 ENCOUNTER — RESULT REVIEW (OUTPATIENT)
Age: 45
End: 2020-11-28

## 2020-11-28 ENCOUNTER — APPOINTMENT (OUTPATIENT)
Dept: ULTRASOUND IMAGING | Facility: IMAGING CENTER | Age: 45
End: 2020-11-28
Payer: MEDICAID

## 2020-11-28 ENCOUNTER — APPOINTMENT (OUTPATIENT)
Dept: MAMMOGRAPHY | Facility: IMAGING CENTER | Age: 45
End: 2020-11-28
Payer: MEDICAID

## 2020-11-28 ENCOUNTER — OUTPATIENT (OUTPATIENT)
Dept: OUTPATIENT SERVICES | Facility: HOSPITAL | Age: 45
LOS: 1 days | End: 2020-11-28
Payer: MEDICAID

## 2020-11-28 DIAGNOSIS — Z90.710 ACQUIRED ABSENCE OF BOTH CERVIX AND UTERUS: Chronic | ICD-10-CM

## 2020-11-28 DIAGNOSIS — O34.21 MATERNAL CARE FOR SCAR FROM PREVIOUS CESAREAN DELIVERY: Chronic | ICD-10-CM

## 2020-11-28 DIAGNOSIS — Z98.89 OTHER SPECIFIED POSTPROCEDURAL STATES: Chronic | ICD-10-CM

## 2020-11-28 DIAGNOSIS — N63 UNSPECIFIED LUMP IN BREAST: Chronic | ICD-10-CM

## 2020-11-28 DIAGNOSIS — Z00.8 ENCOUNTER FOR OTHER GENERAL EXAMINATION: ICD-10-CM

## 2020-11-28 PROCEDURE — 76641 ULTRASOUND BREAST COMPLETE: CPT | Mod: 26,50

## 2020-11-28 PROCEDURE — 76641 ULTRASOUND BREAST COMPLETE: CPT

## 2020-11-28 PROCEDURE — 77063 BREAST TOMOSYNTHESIS BI: CPT

## 2020-11-28 PROCEDURE — 77067 SCR MAMMO BI INCL CAD: CPT | Mod: 26

## 2020-11-28 PROCEDURE — 77067 SCR MAMMO BI INCL CAD: CPT

## 2020-11-28 PROCEDURE — 77063 BREAST TOMOSYNTHESIS BI: CPT | Mod: 26

## 2020-12-01 ENCOUNTER — APPOINTMENT (OUTPATIENT)
Dept: ULTRASOUND IMAGING | Facility: IMAGING CENTER | Age: 45
End: 2020-12-01

## 2020-12-01 ENCOUNTER — APPOINTMENT (OUTPATIENT)
Dept: ULTRASOUND IMAGING | Facility: IMAGING CENTER | Age: 45
End: 2020-12-01
Payer: MEDICAID

## 2020-12-01 ENCOUNTER — RESULT REVIEW (OUTPATIENT)
Age: 45
End: 2020-12-01

## 2020-12-01 ENCOUNTER — OUTPATIENT (OUTPATIENT)
Dept: OUTPATIENT SERVICES | Facility: HOSPITAL | Age: 45
LOS: 1 days | End: 2020-12-01
Payer: MEDICAID

## 2020-12-01 ENCOUNTER — APPOINTMENT (OUTPATIENT)
Dept: MAMMOGRAPHY | Facility: IMAGING CENTER | Age: 45
End: 2020-12-01
Payer: MEDICAID

## 2020-12-01 ENCOUNTER — APPOINTMENT (OUTPATIENT)
Dept: MAMMOGRAPHY | Facility: IMAGING CENTER | Age: 45
End: 2020-12-01

## 2020-12-01 DIAGNOSIS — Z90.710 ACQUIRED ABSENCE OF BOTH CERVIX AND UTERUS: Chronic | ICD-10-CM

## 2020-12-01 DIAGNOSIS — Z98.89 OTHER SPECIFIED POSTPROCEDURAL STATES: Chronic | ICD-10-CM

## 2020-12-01 DIAGNOSIS — O34.21 MATERNAL CARE FOR SCAR FROM PREVIOUS CESAREAN DELIVERY: Chronic | ICD-10-CM

## 2020-12-01 DIAGNOSIS — Z00.8 ENCOUNTER FOR OTHER GENERAL EXAMINATION: ICD-10-CM

## 2020-12-01 DIAGNOSIS — N63 UNSPECIFIED LUMP IN BREAST: Chronic | ICD-10-CM

## 2020-12-01 PROCEDURE — G0279: CPT | Mod: 26

## 2020-12-01 PROCEDURE — 77065 DX MAMMO INCL CAD UNI: CPT | Mod: 26,RT

## 2020-12-01 PROCEDURE — 76642 ULTRASOUND BREAST LIMITED: CPT | Mod: 26,LT

## 2020-12-01 PROCEDURE — G0279: CPT

## 2020-12-01 PROCEDURE — 77065 DX MAMMO INCL CAD UNI: CPT

## 2020-12-01 PROCEDURE — 76642 ULTRASOUND BREAST LIMITED: CPT

## 2020-12-02 RX ORDER — METOPROLOL SUCCINATE 25 MG/1
25 TABLET, EXTENDED RELEASE ORAL
Qty: 45 | Refills: 1 | Status: ACTIVE | COMMUNITY
Start: 1900-01-01 | End: 1900-01-01

## 2020-12-03 ENCOUNTER — APPOINTMENT (OUTPATIENT)
Dept: MAMMOGRAPHY | Facility: IMAGING CENTER | Age: 45
End: 2020-12-03

## 2020-12-03 ENCOUNTER — APPOINTMENT (OUTPATIENT)
Dept: ULTRASOUND IMAGING | Facility: IMAGING CENTER | Age: 45
End: 2020-12-03

## 2021-01-28 LAB
CHOLEST SERPL-MCNC: 234 MG/DL
HDLC SERPL-MCNC: 74 MG/DL
LDLC SERPL CALC-MCNC: 151 MG/DL
NONHDLC SERPL-MCNC: 161 MG/DL
TRIGL SERPL-MCNC: 49 MG/DL

## 2021-01-29 LAB — APO LP(A) SERPL-MCNC: 210.1 NMOL/L

## 2021-02-08 ENCOUNTER — OUTPATIENT (OUTPATIENT)
Dept: OUTPATIENT SERVICES | Facility: HOSPITAL | Age: 46
LOS: 1 days | End: 2021-02-08
Payer: MEDICAID

## 2021-02-08 ENCOUNTER — APPOINTMENT (OUTPATIENT)
Dept: MRI IMAGING | Facility: CLINIC | Age: 46
End: 2021-02-08
Payer: MEDICAID

## 2021-02-08 DIAGNOSIS — Z98.89 OTHER SPECIFIED POSTPROCEDURAL STATES: Chronic | ICD-10-CM

## 2021-02-08 DIAGNOSIS — O34.21 MATERNAL CARE FOR SCAR FROM PREVIOUS CESAREAN DELIVERY: Chronic | ICD-10-CM

## 2021-02-08 DIAGNOSIS — Z00.8 ENCOUNTER FOR OTHER GENERAL EXAMINATION: ICD-10-CM

## 2021-02-08 DIAGNOSIS — N63 UNSPECIFIED LUMP IN BREAST: Chronic | ICD-10-CM

## 2021-02-08 DIAGNOSIS — Z90.710 ACQUIRED ABSENCE OF BOTH CERVIX AND UTERUS: Chronic | ICD-10-CM

## 2021-02-08 PROCEDURE — A9585: CPT

## 2021-02-08 PROCEDURE — 72156 MRI NECK SPINE W/O & W/DYE: CPT

## 2021-02-08 PROCEDURE — 72156 MRI NECK SPINE W/O & W/DYE: CPT | Mod: 26

## 2021-03-19 ENCOUNTER — APPOINTMENT (OUTPATIENT)
Dept: MRI IMAGING | Facility: CLINIC | Age: 46
End: 2021-03-19
Payer: MEDICAID

## 2021-03-19 ENCOUNTER — OUTPATIENT (OUTPATIENT)
Dept: OUTPATIENT SERVICES | Facility: HOSPITAL | Age: 46
LOS: 1 days | End: 2021-03-19
Payer: MEDICAID

## 2021-03-19 DIAGNOSIS — Z98.89 OTHER SPECIFIED POSTPROCEDURAL STATES: Chronic | ICD-10-CM

## 2021-03-19 DIAGNOSIS — Z90.710 ACQUIRED ABSENCE OF BOTH CERVIX AND UTERUS: Chronic | ICD-10-CM

## 2021-03-19 DIAGNOSIS — O34.21 MATERNAL CARE FOR SCAR FROM PREVIOUS CESAREAN DELIVERY: Chronic | ICD-10-CM

## 2021-03-19 DIAGNOSIS — N63 UNSPECIFIED LUMP IN BREAST: Chronic | ICD-10-CM

## 2021-03-19 DIAGNOSIS — G35 MULTIPLE SCLEROSIS: ICD-10-CM

## 2021-03-19 DIAGNOSIS — H46.9 UNSPECIFIED OPTIC NEURITIS: ICD-10-CM

## 2021-03-19 PROCEDURE — A9585: CPT

## 2021-03-19 PROCEDURE — 70553 MRI BRAIN STEM W/O & W/DYE: CPT

## 2021-03-19 PROCEDURE — 70553 MRI BRAIN STEM W/O & W/DYE: CPT | Mod: 26

## 2021-03-19 PROCEDURE — 70543 MRI ORBT/FAC/NCK W/O &W/DYE: CPT | Mod: 26

## 2021-03-19 PROCEDURE — 70543 MRI ORBT/FAC/NCK W/O &W/DYE: CPT

## 2021-04-21 ENCOUNTER — APPOINTMENT (OUTPATIENT)
Dept: SURGERY | Facility: CLINIC | Age: 46
End: 2021-04-21

## 2021-04-26 ENCOUNTER — NON-APPOINTMENT (OUTPATIENT)
Age: 46
End: 2021-04-26

## 2021-04-26 ENCOUNTER — APPOINTMENT (OUTPATIENT)
Dept: OPHTHALMOLOGY | Facility: CLINIC | Age: 46
End: 2021-04-26
Payer: MEDICAID

## 2021-04-26 PROCEDURE — 92004 COMPRE OPH EXAM NEW PT 1/>: CPT

## 2021-04-26 PROCEDURE — 99072 ADDL SUPL MATRL&STAF TM PHE: CPT

## 2021-04-28 ENCOUNTER — APPOINTMENT (OUTPATIENT)
Dept: GASTROENTEROLOGY | Facility: CLINIC | Age: 46
End: 2021-04-28
Payer: MEDICAID

## 2021-04-28 VITALS
TEMPERATURE: 98 F | SYSTOLIC BLOOD PRESSURE: 130 MMHG | HEART RATE: 68 BPM | DIASTOLIC BLOOD PRESSURE: 70 MMHG | HEIGHT: 59 IN | OXYGEN SATURATION: 100 % | WEIGHT: 120 LBS | BODY MASS INDEX: 24.19 KG/M2

## 2021-04-28 DIAGNOSIS — Z12.11 ENCOUNTER FOR SCREENING FOR MALIGNANT NEOPLASM OF COLON: ICD-10-CM

## 2021-04-28 DIAGNOSIS — Z86.010 PERSONAL HISTORY OF COLONIC POLYPS: ICD-10-CM

## 2021-04-28 DIAGNOSIS — Z01.818 ENCOUNTER FOR OTHER PREPROCEDURAL EXAMINATION: ICD-10-CM

## 2021-04-28 PROCEDURE — 99072 ADDL SUPL MATRL&STAF TM PHE: CPT

## 2021-04-28 PROCEDURE — 99204 OFFICE O/P NEW MOD 45 MIN: CPT

## 2021-04-28 RX ORDER — SODIUM PICOSULFATE, MAGNESIUM OXIDE, AND ANHYDROUS CITRIC ACID 10; 3.5; 12 MG/160ML; G/160ML; G/160ML
10-3.5-12 MG-GM LIQUID ORAL
Qty: 1 | Refills: 0 | Status: ACTIVE | COMMUNITY
Start: 2021-04-28 | End: 1900-01-01

## 2021-04-28 RX ORDER — MINERAL OIL 1000 MG/ML
LIQUID ORAL
Qty: 1 | Refills: 0 | Status: ACTIVE | OUTPATIENT
Start: 2021-04-28

## 2021-04-28 RX ORDER — POLYETHYLENE GLYCOL 3350, SODIUM SULFATE, SODIUM CHLORIDE, POTASSIUM CHLORIDE, ASCORBIC ACID, SODIUM ASCORBATE 140-9-5.2G
140 KIT ORAL
Qty: 1 | Refills: 0 | Status: ACTIVE | COMMUNITY
Start: 2021-04-28 | End: 1900-01-01

## 2021-04-28 NOTE — PHYSICAL EXAM
[General Appearance - Alert] : alert [General Appearance - In No Acute Distress] : in no acute distress [Sclera] : the sclera and conjunctiva were normal [PERRL With Normal Accommodation] : pupils were equal in size, round, and reactive to light [Extraocular Movements] : extraocular movements were intact [Outer Ear] : the ears and nose were normal in appearance [Oropharynx] : the oropharynx was normal [Neck Appearance] : the appearance of the neck was normal [Neck Cervical Mass (___cm)] : no neck mass was observed [Jugular Venous Distention Increased] : there was no jugular-venous distention [Thyroid Diffuse Enlargement] : the thyroid was not enlarged [Thyroid Nodule] : there were no palpable thyroid nodules [Auscultation Breath Sounds / Voice Sounds] : lungs were clear to auscultation bilaterally [Heart Rate And Rhythm] : heart rate was normal and rhythm regular [Heart Sounds] : normal S1 and S2 [Heart Sounds Gallop] : no gallops [Murmurs] : no murmurs [Heart Sounds Pericardial Friction Rub] : no pericardial rub [Bowel Sounds] : normal bowel sounds [Abdomen Soft] : soft [Abdomen Tenderness] : non-tender [] : no hepato-splenomegaly [Abdomen Mass (___ Cm)] : no abdominal mass palpated [FreeTextEntry1] : Some tightness in anal canal [No CVA Tenderness] : no ~M costovertebral angle tenderness [No Spinal Tenderness] : no spinal tenderness [Abnormal Walk] : normal gait [Nail Clubbing] : no clubbing  or cyanosis of the fingernails [Musculoskeletal - Swelling] : no joint swelling seen [Motor Tone] : muscle strength and tone were normal [Oriented To Time, Place, And Person] : oriented to person, place, and time [Impaired Insight] : insight and judgment were intact [Affect] : the affect was normal

## 2021-04-28 NOTE — HISTORY OF PRESENT ILLNESS
[FreeTextEntry1] : Patient is a 45-year-old female with MS who suffers from chronic constipation.  She has hard stool.  She had a hemorrhoidectomy in 2018 and developed scarring in the area with an anal stricture.  She had been given dilators to use at home.  She does not see any blood in the stool.  She takes docusate 240 mg with some relief but the stool is still hard and she has to strain to move her bowels.  She denies any bloating or any abdominal cramps.

## 2021-04-29 ENCOUNTER — APPOINTMENT (OUTPATIENT)
Dept: OPHTHALMOLOGY | Facility: CLINIC | Age: 46
End: 2021-04-29
Payer: MEDICAID

## 2021-04-29 ENCOUNTER — NON-APPOINTMENT (OUTPATIENT)
Age: 46
End: 2021-04-29

## 2021-04-29 PROCEDURE — 99072 ADDL SUPL MATRL&STAF TM PHE: CPT

## 2021-04-29 PROCEDURE — 92083 EXTENDED VISUAL FIELD XM: CPT

## 2021-04-29 PROCEDURE — 99215 OFFICE O/P EST HI 40 MIN: CPT

## 2021-04-29 PROCEDURE — 92133 CPTRZD OPH DX IMG PST SGM ON: CPT

## 2021-05-10 NOTE — PATIENT PROFILE ADULT. - NS PRO AD NO ADVANCE DIRECTIVE
VIDEO VISIT PROGRESS NOTE      CHIEF COMPLAINT  Chief Complaint   Patient presents with   • Video Visit   • Dysuria   • Video Visit       SUBJECTIVE  Sun Loaiza is a 32 year old/female who is requesting a Video Visit (V-Visit) for evaluation of pain with urination and blood in the urine that started today.She has to go to the bathroom frequently and feels like she is not able to empty her bladder. Urine is a little cloudy.  She states that she had a UTI several years ago. Denies back pain, fever, or nausea and denies odor of the urine or vaginal discharge. Is confident that she is not pregnant and is not currently breastfeeding.     MEDICATIONS  Current Outpatient Medications   Medication Sig Dispense Refill   • nitrofurantoin, macrocrystal-monohydrate, (Macrobid) 100 MG capsule Take 1 capsule by mouth 2 times daily for 5 days. 10 capsule 0   • Cetirizine HCl (ZYRTEC ALLERGY PO)      • montelukast (SINGULAIR) 10 MG tablet Take 1 tablet by mouth daily. 30 tablet 3   • albuterol 108 (90 Base) MCG/ACT inhaler Take 2 inhalations every 4 hours as needed 1 Inhaler 0   • budesonide-formoterol (Symbicort) 160-4.5 MCG/ACT inhaler Inhale 2 puffs into the lungs 2 times daily. 10.2 g 12   • Aurovela FE 1/20 1-20 MG-MCG per tablet TK 1 T PO D       No current facility-administered medications for this visit.       HISTORIES  I have personally reviewed and updated the following electronic medical record sections: Current medications, Allergies, Problem list, Past Medical History, Past Surgical History, Social History and Family History    REVIEW OF SYSTEMS  GENERAL:  Patient denies fever, chills, tiredness, malaise, weight loss, weight gain.  EYES:  Patient denies blurred vision, double vision, pain.   NEUROLOGIC:  Patient denies tremors, dizzy spells, numbness, tingling.  ENDOCRINE:  Patient denies excessive thirst, heat intolerance, tired/sluggishness.  GASTROINTESTINAL:  Patient denies abdominal pain, nausea/vomiting,  indigestion/heartburn, constipation.  CARDIOVASCUALR:  Patient denies chest pain, shortness of breath, palpitations.  SKIN:  Patient denies skin rashes, boils, persistent itching.  MUSCULOSKELETAL:  Patient denies joint pain, neck pain, back pain.  ENT/MOUTH:  Patient denies ear infections, sore throats, sinus problems.  :  SEE HPI  RESPIRATORY:  Patient denies wheezing, frequent cough, shortness of breath.    PHYSICAL EXAM  Information acquired with patient assistance, demonstration and feedback due to two-way video visit method of visit.  General:   awake, alert and oriented and in no acute distress  Lungs:   No signs of respiratory distress.     ASSESSMENT/PLAN  Acute cystitis with hematuria  - nitrofurantoin, macrocrystal-monohydrate, (Macrobid) 100 MG capsule; Take 1 capsule by mouth 2 times daily for 5 days.  Dispense: 10 capsule; Refill: 0       MEDICAL DECISION MAKING  Given constellation of symptoms reported by patient, Acute Simple Cystitis is likely at a 90% probability.  Due to current global health pandemic applying significant strain to local healthcare resources, presumptive treatment measures are being enacted.  Follow up with PCP if symptoms unresolved at end of antibiotic course.        FOLLOW UP  Return for As needed .    CONSENT   This visit is being performed virtually.  Clinician Location: [facility] Virtual Visits  [place] Location: Home      GENARO De La Paz  05/10/21  4:34 PM         No

## 2021-06-08 ENCOUNTER — APPOINTMENT (OUTPATIENT)
Dept: DERMATOLOGY | Facility: CLINIC | Age: 46
End: 2021-06-08

## 2021-06-14 LAB — SARS-COV-2 N GENE NPH QL NAA+PROBE: NOT DETECTED

## 2021-06-14 NOTE — PHYSICAL EXAM
LOV: 06/07/21    Future Appointments   Date Time Provider Lila Arias   9/8/2021  1:00 PM Marielos Burleson MD [facility] [FreeTextEntry1] : PETRA admits one finger with minimal discomfort, the area of the stricture appears to be wider and smoother compared to the last exam. \par Anoscopy passes the stricture with minimal discomfort.

## 2021-06-17 ENCOUNTER — APPOINTMENT (OUTPATIENT)
Dept: GASTROENTEROLOGY | Facility: AMBULATORY MEDICAL SERVICES | Age: 46
End: 2021-06-17
Payer: MEDICAID

## 2021-06-17 PROCEDURE — 45380 COLONOSCOPY AND BIOPSY: CPT | Mod: 33

## 2021-10-17 ENCOUNTER — APPOINTMENT (OUTPATIENT)
Dept: MRI IMAGING | Facility: CLINIC | Age: 46
End: 2021-10-17
Payer: MEDICAID

## 2021-10-17 ENCOUNTER — OUTPATIENT (OUTPATIENT)
Dept: OUTPATIENT SERVICES | Facility: HOSPITAL | Age: 46
LOS: 1 days | End: 2021-10-17
Payer: MEDICARE

## 2021-10-17 DIAGNOSIS — N63 UNSPECIFIED LUMP IN BREAST: Chronic | ICD-10-CM

## 2021-10-17 DIAGNOSIS — Z00.8 ENCOUNTER FOR OTHER GENERAL EXAMINATION: ICD-10-CM

## 2021-10-17 DIAGNOSIS — Z98.89 OTHER SPECIFIED POSTPROCEDURAL STATES: Chronic | ICD-10-CM

## 2021-10-17 DIAGNOSIS — O34.21 MATERNAL CARE FOR SCAR FROM PREVIOUS CESAREAN DELIVERY: Chronic | ICD-10-CM

## 2021-10-17 DIAGNOSIS — Z90.710 ACQUIRED ABSENCE OF BOTH CERVIX AND UTERUS: Chronic | ICD-10-CM

## 2021-10-17 PROCEDURE — 72156 MRI NECK SPINE W/O & W/DYE: CPT

## 2021-10-17 PROCEDURE — 72157 MRI CHEST SPINE W/O & W/DYE: CPT | Mod: 26

## 2021-10-17 PROCEDURE — 72157 MRI CHEST SPINE W/O & W/DYE: CPT

## 2021-10-17 PROCEDURE — A9585: CPT

## 2021-10-17 PROCEDURE — 72156 MRI NECK SPINE W/O & W/DYE: CPT | Mod: 26

## 2022-04-18 ENCOUNTER — TRANSCRIPTION ENCOUNTER (OUTPATIENT)
Age: 47
End: 2022-04-18

## 2022-04-19 ENCOUNTER — EMERGENCY (EMERGENCY)
Facility: HOSPITAL | Age: 47
LOS: 1 days | Discharge: HOME | End: 2022-04-19
Attending: EMERGENCY MEDICINE
Payer: MEDICARE

## 2022-04-19 VITALS
RESPIRATION RATE: 12 BRPM | OXYGEN SATURATION: 100 % | SYSTOLIC BLOOD PRESSURE: 119 MMHG | HEART RATE: 69 BPM | DIASTOLIC BLOOD PRESSURE: 88 MMHG

## 2022-04-19 VITALS
HEIGHT: 59 IN | HEART RATE: 95 BPM | TEMPERATURE: 99 F | RESPIRATION RATE: 20 BRPM | DIASTOLIC BLOOD PRESSURE: 87 MMHG | SYSTOLIC BLOOD PRESSURE: 148 MMHG | WEIGHT: 117.07 LBS | OXYGEN SATURATION: 100 %

## 2022-04-19 DIAGNOSIS — Z90.710 ACQUIRED ABSENCE OF BOTH CERVIX AND UTERUS: Chronic | ICD-10-CM

## 2022-04-19 DIAGNOSIS — Z98.89 OTHER SPECIFIED POSTPROCEDURAL STATES: Chronic | ICD-10-CM

## 2022-04-19 DIAGNOSIS — O34.21 MATERNAL CARE FOR SCAR FROM PREVIOUS CESAREAN DELIVERY: Chronic | ICD-10-CM

## 2022-04-19 DIAGNOSIS — N63 UNSPECIFIED LUMP IN BREAST: Chronic | ICD-10-CM

## 2022-04-19 LAB
ALBUMIN SERPL ELPH-MCNC: 6.2 G/DL — HIGH (ref 3.3–5)
ALP SERPL-CCNC: 73 U/L — SIGNIFICANT CHANGE UP (ref 40–120)
ALT FLD-CCNC: 11 U/L — SIGNIFICANT CHANGE UP (ref 10–45)
ANION GAP SERPL CALC-SCNC: 18 MMOL/L — HIGH (ref 5–17)
AST SERPL-CCNC: 17 U/L — SIGNIFICANT CHANGE UP (ref 10–40)
BASOPHILS # BLD AUTO: 0.04 K/UL — SIGNIFICANT CHANGE UP (ref 0–0.2)
BASOPHILS NFR BLD AUTO: 0.7 % — SIGNIFICANT CHANGE UP (ref 0–2)
BILIRUB SERPL-MCNC: 2.3 MG/DL — HIGH (ref 0.2–1.2)
BUN SERPL-MCNC: 19 MG/DL — SIGNIFICANT CHANGE UP (ref 7–23)
CALCIUM SERPL-MCNC: 10.9 MG/DL — HIGH (ref 8.4–10.5)
CHLORIDE SERPL-SCNC: 99 MMOL/L — SIGNIFICANT CHANGE UP (ref 96–108)
CO2 SERPL-SCNC: 24 MMOL/L — SIGNIFICANT CHANGE UP (ref 22–31)
CREAT SERPL-MCNC: 1.06 MG/DL — SIGNIFICANT CHANGE UP (ref 0.5–1.3)
EGFR: 66 ML/MIN/1.73M2 — SIGNIFICANT CHANGE UP
EOSINOPHIL # BLD AUTO: 0.04 K/UL — SIGNIFICANT CHANGE UP (ref 0–0.5)
EOSINOPHIL NFR BLD AUTO: 0.7 % — SIGNIFICANT CHANGE UP (ref 0–6)
GLUCOSE SERPL-MCNC: 94 MG/DL — SIGNIFICANT CHANGE UP (ref 70–99)
HCT VFR BLD CALC: 45.7 % — HIGH (ref 34.5–45)
HGB BLD-MCNC: 15.3 G/DL — SIGNIFICANT CHANGE UP (ref 11.5–15.5)
IMM GRANULOCYTES NFR BLD AUTO: 0.2 % — SIGNIFICANT CHANGE UP (ref 0–1.5)
LYMPHOCYTES # BLD AUTO: 2.07 K/UL — SIGNIFICANT CHANGE UP (ref 1–3.3)
LYMPHOCYTES # BLD AUTO: 34 % — SIGNIFICANT CHANGE UP (ref 13–44)
MCHC RBC-ENTMCNC: 29.3 PG — SIGNIFICANT CHANGE UP (ref 27–34)
MCHC RBC-ENTMCNC: 33.5 GM/DL — SIGNIFICANT CHANGE UP (ref 32–36)
MCV RBC AUTO: 87.4 FL — SIGNIFICANT CHANGE UP (ref 80–100)
MONOCYTES # BLD AUTO: 0.19 K/UL — SIGNIFICANT CHANGE UP (ref 0–0.9)
MONOCYTES NFR BLD AUTO: 3.1 % — SIGNIFICANT CHANGE UP (ref 2–14)
NEUTROPHILS # BLD AUTO: 3.73 K/UL — SIGNIFICANT CHANGE UP (ref 1.8–7.4)
NEUTROPHILS NFR BLD AUTO: 61.3 % — SIGNIFICANT CHANGE UP (ref 43–77)
NRBC # BLD: 0 /100 WBCS — SIGNIFICANT CHANGE UP (ref 0–0)
PLATELET # BLD AUTO: 278 K/UL — SIGNIFICANT CHANGE UP (ref 150–400)
POTASSIUM SERPL-MCNC: 3.7 MMOL/L — SIGNIFICANT CHANGE UP (ref 3.5–5.3)
POTASSIUM SERPL-SCNC: 3.7 MMOL/L — SIGNIFICANT CHANGE UP (ref 3.5–5.3)
PROT SERPL-MCNC: 9.1 G/DL — HIGH (ref 6–8.3)
RBC # BLD: 5.23 M/UL — HIGH (ref 3.8–5.2)
RBC # FLD: 12 % — SIGNIFICANT CHANGE UP (ref 10.3–14.5)
SODIUM SERPL-SCNC: 141 MMOL/L — SIGNIFICANT CHANGE UP (ref 135–145)
TROPONIN T, HIGH SENSITIVITY RESULT: <6 NG/L — SIGNIFICANT CHANGE UP (ref 0–51)
WBC # BLD: 6.08 K/UL — SIGNIFICANT CHANGE UP (ref 3.8–10.5)
WBC # FLD AUTO: 6.08 K/UL — SIGNIFICANT CHANGE UP (ref 3.8–10.5)

## 2022-04-19 PROCEDURE — 84484 ASSAY OF TROPONIN QUANT: CPT

## 2022-04-19 PROCEDURE — 99285 EMERGENCY DEPT VISIT HI MDM: CPT | Mod: 25

## 2022-04-19 PROCEDURE — 82248 BILIRUBIN DIRECT: CPT

## 2022-04-19 PROCEDURE — 36415 COLL VENOUS BLD VENIPUNCTURE: CPT

## 2022-04-19 PROCEDURE — 93010 ELECTROCARDIOGRAM REPORT: CPT

## 2022-04-19 PROCEDURE — 80053 COMPREHEN METABOLIC PANEL: CPT

## 2022-04-19 PROCEDURE — 76937 US GUIDE VASCULAR ACCESS: CPT | Mod: 26

## 2022-04-19 PROCEDURE — 85025 COMPLETE CBC W/AUTO DIFF WBC: CPT

## 2022-04-19 PROCEDURE — 76937 US GUIDE VASCULAR ACCESS: CPT

## 2022-04-19 PROCEDURE — 93005 ELECTROCARDIOGRAM TRACING: CPT

## 2022-04-19 NOTE — ED PROVIDER NOTE - CLINICAL SUMMARY MEDICAL DECISION MAKING FREE TEXT BOX
markell 46 f with remote fam ho of heart dz with stuttering sternal cp lasting < 30 minutes non exerational  on thursday, and again on sunday with rad to l arm - no cp since - no sob or assoc s/s-- low risk cp with nonischemic ekg -- will send biomarkers if neg dc with out pt fu

## 2022-04-19 NOTE — ED PROVIDER NOTE - NSICDXPASTSURGICALHX_GEN_ALL_CORE_FT
PAST SURGICAL HISTORY:  Delivery with history of      H/O ovarian cystectomy     Left breast mass surgical excision, benign    S/P laparoscopic hysterectomy Bilateral Salpingectomy, Lysis of Adhesions,     Status post hysteroscopy uterine polypectomy,     Status post tonsillectomy age 19

## 2022-04-19 NOTE — ED PROVIDER NOTE - NSFOLLOWUPINSTRUCTIONS_ED_ALL_ED_FT
Discharge instructions:    - Please follow up with your Primary Care Doctor within the next 3-5 days.    - For persistent pain, we recommend you follow up with your cardiologist.     - Tylenol up to 650 mg every 8 hours as needed for pain and/or Motrin up to 600 mg every 6 hours as needed for pain. Take any prescribed medications as instructed:         SEEK IMMEDIATE MEDICAL CARE IF YOU HAVE ANY OF THE FOLLOWING SYMPTOMS: worsening chest pain, coughing up blood, unexplained back/neck/jaw pain, severe abdominal pain, dizziness or lightheadedness, fainting, shortness of breath, sweaty or clammy skin, vomiting, or racing heart beat. These symptoms may represent a serious problem that is an emergency. Do not wait to see if the symptoms will go away. Get medical help right away. Call 911 and do not drive yourself to the hospital.

## 2022-04-19 NOTE — ED PROVIDER NOTE - OBJECTIVE STATEMENT
45yo F w/ history of MS (last flare up several years ago) coming in w/ left arm discomfort and 2 episodes of left sided 45yo F w/ history of MS (last flare up several years ago) coming in w/ left arm discomfort and 2 episodes of left sided chest pain; once 4 days ago and again 2 days. Each episode lasted approximately 10 minutes and was not associated w/ exertion, shortness of breath, dizziness, nausea or abdominal pain. Was seen at  yesterday and was told to come to the ED. Patient currently asymptomatic; denies chest pain, SOB, nausea, vomiting or diarrhea.

## 2022-04-19 NOTE — ED PROVIDER NOTE - PHYSICAL EXAMINATION
GENERAL: well appearing in no acute distress, non-toxic appearing  HEAD: normocephalic, atraumatic  HENT: airway intact, neck supple  EYES: normal conjunctiva, PERRLA, EOMI  CARDIAC: regular rate and rhythm, normal S1S2, no appreciable murmurs, 2+ pulses in UE/LE b/l  PULM: normal breath sounds, clear to ascultation bilaterally, no rales, rhonchi, wheezing  GI: abdomen nondistended, soft, nontender, no guarding, rebound tenderness  NEURO: no focal motor or sensory deficits, CN2-12 intact, normal speech, AAOx3  MSK: no peripheral edema  SKIN: well-perfused, extremities warm

## 2022-04-19 NOTE — ED ADULT NURSE NOTE - OBJECTIVE STATEMENT
46 y.o female, A&Ox4, PMH MS, HLD, pt presents to ED c/o chest and left arm discomfort. Pt states last week she had an episode of chest discomfort described as squeezing that last 10 minutes and then went away. pt states on sunday she had the same episode along with left arm discomfort, pt states she was not doing anything when the chest discomfort began, pt states she did not take anything for the discomfort it went away on its own. pt states she went to urgent care yesterday where they wanted pt to come to ED for further eval as her HR and BP was elevated. pt denies headaches, sob, N/V/D, chest pain, abdominal pain, fever, chills at this time. safety and comfort provided.

## 2022-04-19 NOTE — ED PROVIDER NOTE - PATIENT PORTAL LINK FT
You can access the FollowMyHealth Patient Portal offered by [facility] by registering at the following website: [email or web address]. By joining [facility]’s FollowMyHealth portal, you will also be able to view your health information using other applications (apps) compatible with our system.

## 2022-04-19 NOTE — ED ADULT NURSE NOTE - NSICDXPASTMEDICALHX_GEN_ALL_CORE_FT
PAST MEDICAL HISTORY:  Hemorrhoids     Multiple sclerosis relapsing/remitting - recent exacerbation 12/2017 required hospitalization & steroid treatment    Ovarian cyst     Pain in both lower extremities with intermittent weakness    Patient is [employer]     Uterine leiomyoma

## 2022-04-19 NOTE — ED PROVIDER NOTE - NS ED ROS FT
General: denies fever, chills  HENT: denies nasal congestion, rhinorrhea  Eyes: denies visual changes, blurred vision  CV: chest pain  Resp: denies difficulty breathing, cough  Abdominal: denies nausea, vomiting, diarrhea, abdominal pain  : denies urinary pain or discharge  MSK: arm pain  Neuro: denies headaches, numbness, tingling  Skin: denies rashes, bruises

## 2022-04-19 NOTE — ED PROVIDER NOTE - NSICDXPASTMEDICALHX_GEN_ALL_CORE_FT
PAST MEDICAL HISTORY:  Hemorrhoids     Multiple sclerosis relapsing/remitting - recent exacerbation 12/2017 required hospitalization & steroid treatment    Ovarian cyst     Pain in both lower extremities with intermittent weakness    Patient is [employer]     Uterine leiomyoma

## 2022-04-19 NOTE — ED ADULT NURSE REASSESSMENT NOTE - NS ED NURSE REASSESS COMMENT FT1
unable to get IV access/blood draw, attempted by DELISA mireles as well and unsuccessful, MD de santiago aware and will place ultrasounded guided access.

## 2022-04-19 NOTE — ED PROVIDER NOTE - PROGRESS NOTE DETAILS
trop neg, pt bili 2.3 -- no transaminase or alk phose elev ,, no abd pain --- will conjugate bili - likely indirect and can be fu outpt = pt to fu w dr dumont for cards Damien, PGY2: direct bili 0.2; informed patient. Continues to feel asymptomatic. Strict return precautions provided and patient understands and agrees w/ plan. Damien, PGY2: direct bili 0.2; informed patient regarding total bilirubin 2.3. Patient states she's had fluctuating bilirubin for years and was told she might have Gilbert sybdrine. Continues to feel asymptomatic. Strict return precautions provided and patient understands and agrees w/ plan.

## 2022-04-19 NOTE — ED ADULT TRIAGE NOTE - AS HEIGHT TYPE
Problem: Diabetes in Pregnancy  Goal: Blood Glucose Level Within Targeted Range  Outcome: Ongoing, Progressing     Problem: Adult Inpatient Plan of Care  Goal: Plan of Care Review  Outcome: Ongoing, Progressing  Goal: Patient-Specific Goal (Individualized)  Outcome: Ongoing, Progressing  Goal: Absence of Hospital-Acquired Illness or Injury  Outcome: Ongoing, Progressing  Goal: Optimal Comfort and Wellbeing  Outcome: Ongoing, Progressing  Goal: Readiness for Transition of Care  Outcome: Ongoing, Progressing     Problem:  Fall Injury Risk  Goal: Absence of Fall, Infant Drop and Related Injury  Outcome: Ongoing, Progressing     Problem: Bleeding (Labor)  Goal: Hemostasis  Outcome: Ongoing, Progressing     Problem: Change in Fetal Wellbeing (Labor)  Goal: Stable Fetal Wellbeing  Outcome: Ongoing, Progressing     Problem: Delayed Labor Progression (Labor)  Goal: Effective Progression to Delivery  Outcome: Ongoing, Progressing     Problem: Infection  Goal: Absence of Infection Signs and Symptoms  Outcome: Ongoing, Progressing     Problem: Adjustment to Role Transition (Postpartum  Delivery)  Goal: Successful Maternal Role Transition  Outcome: Ongoing, Progressing     Problem: Bleeding (Postpartum  Delivery)  Goal: Hemostasis  Outcome: Ongoing, Progressing     Problem: Infection (Postpartum  Delivery)  Goal: Absence of Infection Signs and Symptoms  Outcome: Ongoing, Progressing     Problem: Pain (Postpartum  Delivery)  Goal: Acceptable Pain Control  Outcome: Ongoing, Progressing     Problem: Postoperative Nausea and Vomiting (Postpartum  Delivery)  Goal: Nausea and Vomiting Relief  Outcome: Ongoing, Progressing     Problem: Postoperative Urinary Retention (Postpartum  Delivery)  Goal: Effective Urinary Elimination  Outcome: Ongoing, Progressing      stated

## 2022-05-21 ENCOUNTER — APPOINTMENT (OUTPATIENT)
Dept: MRI IMAGING | Facility: CLINIC | Age: 47
End: 2022-05-21
Payer: MEDICARE

## 2022-05-21 PROCEDURE — 70553 MRI BRAIN STEM W/O & W/DYE: CPT

## 2022-05-21 PROCEDURE — A9585: CPT | Mod: JW

## 2022-05-24 ENCOUNTER — APPOINTMENT (OUTPATIENT)
Dept: GASTROENTEROLOGY | Facility: CLINIC | Age: 47
End: 2022-05-24

## 2022-06-06 NOTE — DISCHARGE NOTE ADULT - VISION (WITH CORRECTIVE LENSES IF THE PATIENT USUALLY WEARS THEM):
Normal vision: sees adequately in most situations; can see medication labels, newsprint Previously Declined (within the last year)

## 2022-07-11 ENCOUNTER — APPOINTMENT (OUTPATIENT)
Dept: GASTROENTEROLOGY | Facility: CLINIC | Age: 47
End: 2022-07-11

## 2022-07-11 ENCOUNTER — APPOINTMENT (OUTPATIENT)
Dept: ULTRASOUND IMAGING | Facility: IMAGING CENTER | Age: 47
End: 2022-07-11

## 2022-07-11 ENCOUNTER — OUTPATIENT (OUTPATIENT)
Dept: OUTPATIENT SERVICES | Facility: HOSPITAL | Age: 47
LOS: 1 days | End: 2022-07-11
Payer: MEDICARE

## 2022-07-11 ENCOUNTER — OUTPATIENT (OUTPATIENT)
Dept: OUTPATIENT SERVICES | Facility: HOSPITAL | Age: 47
LOS: 1 days | End: 2022-07-11

## 2022-07-11 ENCOUNTER — APPOINTMENT (OUTPATIENT)
Dept: MAMMOGRAPHY | Facility: IMAGING CENTER | Age: 47
End: 2022-07-11

## 2022-07-11 VITALS
SYSTOLIC BLOOD PRESSURE: 120 MMHG | DIASTOLIC BLOOD PRESSURE: 86 MMHG | OXYGEN SATURATION: 97 % | WEIGHT: 118 LBS | HEART RATE: 88 BPM | BODY MASS INDEX: 23.79 KG/M2 | TEMPERATURE: 97.7 F | HEIGHT: 59 IN

## 2022-07-11 DIAGNOSIS — Z00.8 ENCOUNTER FOR OTHER GENERAL EXAMINATION: ICD-10-CM

## 2022-07-11 DIAGNOSIS — Z90.710 ACQUIRED ABSENCE OF BOTH CERVIX AND UTERUS: Chronic | ICD-10-CM

## 2022-07-11 DIAGNOSIS — Z98.89 OTHER SPECIFIED POSTPROCEDURAL STATES: Chronic | ICD-10-CM

## 2022-07-11 DIAGNOSIS — N63 UNSPECIFIED LUMP IN BREAST: Chronic | ICD-10-CM

## 2022-07-11 DIAGNOSIS — G35 MULTIPLE SCLEROSIS: ICD-10-CM

## 2022-07-11 DIAGNOSIS — O34.21 MATERNAL CARE FOR SCAR FROM PREVIOUS CESAREAN DELIVERY: Chronic | ICD-10-CM

## 2022-07-11 DIAGNOSIS — K59.09 OTHER CONSTIPATION: ICD-10-CM

## 2022-07-11 DIAGNOSIS — K62.4 STENOSIS OF ANUS AND RECTUM: ICD-10-CM

## 2022-07-11 DIAGNOSIS — R07.89 OTHER CHEST PAIN: ICD-10-CM

## 2022-07-11 PROCEDURE — 76641 ULTRASOUND BREAST COMPLETE: CPT | Mod: 26,50

## 2022-07-11 PROCEDURE — 77067 SCR MAMMO BI INCL CAD: CPT | Mod: 26

## 2022-07-11 PROCEDURE — 77063 BREAST TOMOSYNTHESIS BI: CPT

## 2022-07-11 PROCEDURE — 76641 ULTRASOUND BREAST COMPLETE: CPT

## 2022-07-11 PROCEDURE — 76700 US EXAM ABDOM COMPLETE: CPT | Mod: 26

## 2022-07-11 PROCEDURE — 99214 OFFICE O/P EST MOD 30 MIN: CPT

## 2022-07-11 PROCEDURE — 76700 US EXAM ABDOM COMPLETE: CPT

## 2022-07-11 PROCEDURE — 77063 BREAST TOMOSYNTHESIS BI: CPT | Mod: 26

## 2022-07-11 PROCEDURE — 77067 SCR MAMMO BI INCL CAD: CPT

## 2022-07-11 RX ORDER — OMEPRAZOLE 20 MG/1
20 CAPSULE, DELAYED RELEASE ORAL
Qty: 14 | Refills: 0 | Status: ACTIVE | COMMUNITY
Start: 2022-01-24

## 2022-07-11 RX ORDER — ALPRAZOLAM 0.25 MG/1
0.25 TABLET ORAL
Qty: 5 | Refills: 0 | Status: ACTIVE | COMMUNITY
Start: 2022-05-25

## 2022-07-11 RX ORDER — IBUPROFEN 600 MG/1
600 TABLET, FILM COATED ORAL
Qty: 12 | Refills: 0 | Status: ACTIVE | COMMUNITY
Start: 2022-02-22

## 2022-07-11 RX ORDER — METHYLPREDNISOLONE SODIUM SUCCINATE 1 G/16ML
1000 INJECTION, POWDER, LYOPHILIZED, FOR SOLUTION INTRAMUSCULAR; INTRAVENOUS
Qty: 3 | Refills: 0 | Status: ACTIVE | COMMUNITY
Start: 2022-05-25

## 2022-07-11 RX ORDER — AMOXICILLIN 500 MG/1
500 CAPSULE ORAL
Qty: 21 | Refills: 0 | Status: ACTIVE | COMMUNITY
Start: 2022-02-22

## 2022-07-11 RX ORDER — NAPROXEN 500 MG/1
500 TABLET ORAL
Qty: 28 | Refills: 0 | Status: ACTIVE | COMMUNITY
Start: 2022-01-24

## 2022-07-11 RX ORDER — TRIAMCINOLONE ACETONIDE 1 MG/G
0.1 OINTMENT TOPICAL
Qty: 15 | Refills: 0 | Status: ACTIVE | COMMUNITY
Start: 2022-07-04

## 2022-07-11 RX ORDER — POLYETHYLENE GLYCOL 3350 17 G/17G
17 POWDER, FOR SOLUTION ORAL DAILY
Qty: 1 | Refills: 0 | Status: ACTIVE | COMMUNITY
Start: 2021-04-28

## 2022-07-11 RX ORDER — ACETAMINOPHEN AND CODEINE 300; 30 MG/1; MG/1
300-30 TABLET ORAL
Qty: 18 | Refills: 0 | Status: ACTIVE | COMMUNITY
Start: 2022-02-22

## 2022-07-11 NOTE — PHYSICAL EXAM
[General Appearance - Alert] : alert [General Appearance - In No Acute Distress] : in no acute distress [Sclera] : the sclera and conjunctiva were normal [PERRL With Normal Accommodation] : pupils were equal in size, round, and reactive to light [Extraocular Movements] : extraocular movements were intact [Outer Ear] : the ears and nose were normal in appearance [Oropharynx] : the oropharynx was normal [Neck Appearance] : the appearance of the neck was normal [Neck Cervical Mass (___cm)] : no neck mass was observed [Jugular Venous Distention Increased] : there was no jugular-venous distention [Thyroid Diffuse Enlargement] : the thyroid was not enlarged [Thyroid Nodule] : there were no palpable thyroid nodules [Auscultation Breath Sounds / Voice Sounds] : lungs were clear to auscultation bilaterally [Heart Rate And Rhythm] : heart rate was normal and rhythm regular [Heart Sounds] : normal S1 and S2 [Heart Sounds Gallop] : no gallops [Murmurs] : no murmurs [Heart Sounds Pericardial Friction Rub] : no pericardial rub [Bowel Sounds] : normal bowel sounds [Abdomen Soft] : soft [Abdomen Tenderness] : non-tender [] : no hepato-splenomegaly [Abdomen Mass (___ Cm)] : no abdominal mass palpated [No CVA Tenderness] : no ~M costovertebral angle tenderness [No Spinal Tenderness] : no spinal tenderness [Abnormal Walk] : normal gait [Musculoskeletal - Swelling] : no joint swelling seen [Nail Clubbing] : no clubbing  or cyanosis of the fingernails [Motor Tone] : muscle strength and tone were normal [Oriented To Time, Place, And Person] : oriented to person, place, and time [Impaired Insight] : insight and judgment were intact [Affect] : the affect was normal

## 2022-07-11 NOTE — ASSESSMENT
[FreeTextEntry1] : Patient with history of MS and chronic constipation.  She will reduce MiraLAX to every other day.  She will also take glycerin suppositories every other day.  This seems to have helped her.  If the constipation continues, a trial of lactulose will be given.

## 2022-07-11 NOTE — HISTORY OF PRESENT ILLNESS
[FreeTextEntry1] : Patient is a 46-year-old female with MS who suffers from chronic constipation.  She has hard stool.  She had a hemorrhoidectomy in 2018 and developed scarring in the area with an anal stricture.  She had been given dilators to use at home.  She does not see any blood in the stool.  She takes docusate 240 mg with some relief but the stool is still hard and she has to strain to move her bowels.  She denies any bloating or any abdominal cramps.\par She takes MiraLAX daily.  She also use glycerin suppositories as needed with good relief of her symptoms.\par She has not had any relief with Linzess in the past.

## 2022-07-18 ENCOUNTER — NON-APPOINTMENT (OUTPATIENT)
Age: 47
End: 2022-07-18

## 2022-07-31 ENCOUNTER — APPOINTMENT (OUTPATIENT)
Dept: MRI IMAGING | Facility: IMAGING CENTER | Age: 47
End: 2022-07-31

## 2022-09-02 ENCOUNTER — APPOINTMENT (OUTPATIENT)
Dept: MRI IMAGING | Facility: CLINIC | Age: 47
End: 2022-09-02

## 2022-09-02 PROCEDURE — 72157 MRI CHEST SPINE W/O & W/DYE: CPT

## 2022-09-02 PROCEDURE — 72156 MRI NECK SPINE W/O & W/DYE: CPT

## 2022-09-02 PROCEDURE — A9585: CPT

## 2022-09-02 RX ORDER — LACTULOSE 10 G/15ML
20 SOLUTION ORAL
Qty: 1800 | Refills: 1 | Status: ACTIVE | COMMUNITY
Start: 2022-09-02 | End: 1900-01-01

## 2022-09-08 DIAGNOSIS — K64.9 UNSPECIFIED HEMORRHOIDS: ICD-10-CM

## 2022-09-08 RX ORDER — HYDROCORTISONE 2.5% 25 MG/G
2.5 CREAM TOPICAL TWICE DAILY
Qty: 1 | Refills: 1 | Status: ACTIVE | COMMUNITY
Start: 2022-09-08 | End: 1900-01-01

## 2022-12-08 ENCOUNTER — APPOINTMENT (OUTPATIENT)
Dept: CARDIOLOGY | Facility: CLINIC | Age: 47
End: 2022-12-08

## 2022-12-08 ENCOUNTER — NON-APPOINTMENT (OUTPATIENT)
Age: 47
End: 2022-12-08

## 2022-12-08 VITALS
OXYGEN SATURATION: 100 % | DIASTOLIC BLOOD PRESSURE: 88 MMHG | HEIGHT: 59 IN | BODY MASS INDEX: 24.19 KG/M2 | HEART RATE: 86 BPM | WEIGHT: 120 LBS | SYSTOLIC BLOOD PRESSURE: 145 MMHG

## 2022-12-08 DIAGNOSIS — R00.2 PALPITATIONS: ICD-10-CM

## 2022-12-08 DIAGNOSIS — G35 MULTIPLE SCLEROSIS: ICD-10-CM

## 2022-12-08 PROCEDURE — 99213 OFFICE O/P EST LOW 20 MIN: CPT

## 2022-12-08 PROCEDURE — 93000 ELECTROCARDIOGRAM COMPLETE: CPT

## 2022-12-08 RX ORDER — METHYLPREDNISOLONE 4 MG/1
4 TABLET ORAL
Qty: 21 | Refills: 0 | Status: DISCONTINUED | COMMUNITY
Start: 2022-11-01

## 2022-12-08 RX ORDER — FLUTICASONE PROPIONATE 0.5 MG/G
0.05 CREAM TOPICAL
Qty: 60 | Refills: 0 | Status: DISCONTINUED | COMMUNITY
Start: 2022-11-29

## 2022-12-08 RX ORDER — CIPROFLOXACIN HYDROCHLORIDE 500 MG/1
500 TABLET, FILM COATED ORAL
Qty: 6 | Refills: 0 | Status: DISCONTINUED | COMMUNITY
Start: 2022-09-20

## 2022-12-08 RX ORDER — COVID-19 ANTIGEN TEST
KIT MISCELLANEOUS
Qty: 8 | Refills: 0 | Status: DISCONTINUED | COMMUNITY
Start: 2022-11-15

## 2022-12-08 RX ORDER — PREGABALIN 50 MG/1
50 CAPSULE ORAL
Qty: 60 | Refills: 0 | Status: DISCONTINUED | COMMUNITY
Start: 2022-11-01

## 2022-12-08 NOTE — DISCUSSION/SUMMARY
[FreeTextEntry1] : The patient is a 47-year-old female +FH MS, HLD with palpitations secondary to PVC.\par #1 CV- normal ECG, metoprolol prn, ECHO ordered\par #2 HLD- now on atorvastatin with improvement\par #3 MS- stable on tecfidera\par #4 General- stress mgmt, regular daily exercise encouraged [EKG obtained to assist in diagnosis and management of assessed problem(s)] : EKG obtained to assist in diagnosis and management of assessed problem(s)

## 2022-12-08 NOTE — HISTORY OF PRESENT ILLNESS
[FreeTextEntry1] : Kadie continues to be very nervous about heart disease in view of +FH. Palpitations continue but no more than last visit. Was in ER a few months ago with chest pain but all negative. No recurrence.

## 2022-12-09 ENCOUNTER — NON-APPOINTMENT (OUTPATIENT)
Age: 47
End: 2022-12-09

## 2022-12-10 ENCOUNTER — APPOINTMENT (OUTPATIENT)
Dept: CARDIOLOGY | Facility: CLINIC | Age: 47
End: 2022-12-10
Payer: MEDICARE

## 2022-12-10 DIAGNOSIS — R07.89 OTHER CHEST PAIN: ICD-10-CM

## 2022-12-10 PROCEDURE — 93306 TTE W/DOPPLER COMPLETE: CPT

## 2022-12-28 NOTE — PATIENT PROFILE ADULT. - FALLEN IN THE PAST
Patient's daughter navneet called back and stated that the patient's last visit was on 1/24/2021. Patients daughter in law does state that patient is still in the hospital at this time.    no

## 2023-01-30 NOTE — H&P PST ADULT - LAST ECHOCARDIOGRAM
Group Topic: BH OLIVE Activity Group    Date: 1/30/2023  Start Time: 11:00 AM  End Time: 11:45 AM  Facilitators: Guerita Louis    Focus: Holistic Symptom Management in Early Recovery   Number in attendance: 15      Basic Mindfulness is an experiential group focused on exposing patients to select adaptive symptom management behaviors. Patients are provided with education on the select skill, followed by instruction and reflection. Skills are meant to not only be utilized for recovery symptom management but for all distressing experiences that could impact quality life.        Goals: Replacing Unhealthy activity with Healthy Outlets (Meditation)   Handouts: [place] and [facility] information  Method: Group   Attendance: Present   Mood/Affect: Appropriate   Behavior/Socialization: Appropriate to group   Participation: Minimal   Overall Patient Response to Group: Appropriate to topic   Individual Group Response: Worked on independent activities, talked to another patient  Ability to Apply Content to Group: 5            Primary Group facilitator: Guerita Louis Primary Instructor              10/26/2016

## 2023-03-31 NOTE — ED ADULT NURSE NOTE - PMH
Detail Level: Detailed
General Sunscreen Counseling: I recommended a broad spectrum sunscreen with a SPF of 30 or higher. I explained that SPF 30 sunscreens block approximately 97 percent of the sun's harmful rays. Sunscreens should be applied at least 15 minutes prior to expected sun exposure and then every 2 hours after that as long as sun exposure continues. If swimming or exercising sunscreen should be reapplied every 45 minutes to an hour after getting wet or sweating. One ounce, or the equivalent of a shot glass full of sunscreen, is adequate to protect the skin not covered by a bathing suit. I also recommended a lip balm with a sunscreen as well. Sun protective clothing can be used in lieu of sunscreen but must be worn the entire time you are exposed to the sun's rays.
Multiple sclerosis  relapsing/remitting - recent exacerbation June 2015 required hospitalization & steroid treament  Ovarian cyst    Patient is [employer]    Uterine leiomyoma
Products Recommended: Cerave

## 2023-05-23 ENCOUNTER — APPOINTMENT (OUTPATIENT)
Dept: MRI IMAGING | Facility: CLINIC | Age: 48
End: 2023-05-23
Payer: MEDICARE

## 2023-05-23 PROCEDURE — 70553 MRI BRAIN STEM W/O & W/DYE: CPT

## 2023-05-23 PROCEDURE — A9585: CPT | Mod: JW
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
## 2023-08-09 ENCOUNTER — APPOINTMENT (OUTPATIENT)
Dept: ULTRASOUND IMAGING | Facility: CLINIC | Age: 48
End: 2023-08-09
Payer: MEDICARE

## 2023-08-09 ENCOUNTER — APPOINTMENT (OUTPATIENT)
Dept: MAMMOGRAPHY | Facility: CLINIC | Age: 48
End: 2023-08-09
Payer: MEDICARE

## 2023-08-09 PROCEDURE — 76641 ULTRASOUND BREAST COMPLETE: CPT | Mod: 50

## 2023-08-09 PROCEDURE — 77063 BREAST TOMOSYNTHESIS BI: CPT

## 2023-08-09 PROCEDURE — 77067 SCR MAMMO BI INCL CAD: CPT

## 2023-10-16 NOTE — DISCHARGE NOTE ADULT - OTHER SIGNIFICANT FINDINGS
out with sagittal surface coil imaging from C1 to L3 using T1 and fast   spin echo T2 weighted images with and without fat saturation technique   with axial T1 and fast spin echo T2 weighted imaging on a 1.5 Mary   magnet. Post contrast axial and sagittal T1 weighted images were   obtained. 7 cc of Gadavist were intravenously injected, 0.5 cc were   discarded.     Comparison is made with the prior MRI of 3/4/2017.     Compared with the previous examination, the cervical spinal cord is   enlarged and there is less abnormal signal identified on the T2-weighted   images. There is still signal hyperintensity identified within the cord   from C3 through C6. There is no significant contrast enhancement. The   cervical vertebral bodies and intervertebral discs are normal.        Evaluation of the thoracic spine demonstrates increased signal within the   cord at from T2 through T5 and at T9-10. This is not significantly   changed since the previous examination of 3/4/2017. There is minimal cord   enhancement at the T5 level which is new since the prior exam. The conus   terminates normally at the L1 level.    Impression: Compared with the MRI of 3/4/2017 there is less cord   expansion and less abnormal signal within the cervical and thoracic cord.   Several residual lesions are identified and there is minimal cord   enhancement T5 which is new compared to the prior examination. 60-year-old male, history of CKD, DM, HTN, Charcot foot status postsurgery 6 weeks ago, requested blood work because he wants to make sure kidney function is good.  Labs noted for WBC 14.3, hemoglobin 7.7, BUN 53, creatinine 3.5, glucose 39.  Urinalysis negative.  Patient given food and orange juice in the ED.  States he has not been eating much lately and needs to adjust his insulin dosage.  After notifying him of blood work, patient states he wants to go home.  Will clear for DC and refer to PCP.

## 2024-01-30 ENCOUNTER — APPOINTMENT (OUTPATIENT)
Dept: DERMATOLOGY | Facility: CLINIC | Age: 49
End: 2024-01-30

## 2024-02-06 ENCOUNTER — APPOINTMENT (OUTPATIENT)
Dept: ULTRASOUND IMAGING | Facility: IMAGING CENTER | Age: 49
End: 2024-02-06
Payer: MEDICARE

## 2024-02-06 ENCOUNTER — OUTPATIENT (OUTPATIENT)
Dept: OUTPATIENT SERVICES | Facility: HOSPITAL | Age: 49
LOS: 1 days | End: 2024-02-06
Payer: MEDICARE

## 2024-02-06 DIAGNOSIS — Z00.8 ENCOUNTER FOR OTHER GENERAL EXAMINATION: ICD-10-CM

## 2024-02-06 DIAGNOSIS — Z98.89 OTHER SPECIFIED POSTPROCEDURAL STATES: Chronic | ICD-10-CM

## 2024-02-06 DIAGNOSIS — O34.21 MATERNAL CARE FOR SCAR FROM PREVIOUS CESAREAN DELIVERY: Chronic | ICD-10-CM

## 2024-02-06 DIAGNOSIS — Z90.710 ACQUIRED ABSENCE OF BOTH CERVIX AND UTERUS: Chronic | ICD-10-CM

## 2024-02-06 DIAGNOSIS — N63 UNSPECIFIED LUMP IN BREAST: Chronic | ICD-10-CM

## 2024-02-06 PROCEDURE — 76642 ULTRASOUND BREAST LIMITED: CPT

## 2024-02-06 PROCEDURE — 76642 ULTRASOUND BREAST LIMITED: CPT | Mod: 26,LT

## 2024-03-19 ENCOUNTER — NON-APPOINTMENT (OUTPATIENT)
Age: 49
End: 2024-03-19

## 2024-03-20 ENCOUNTER — APPOINTMENT (OUTPATIENT)
Dept: DERMATOLOGY | Facility: CLINIC | Age: 49
End: 2024-03-20
Payer: MEDICARE

## 2024-03-20 DIAGNOSIS — L65.8 OTHER SPECIFIED NONSCARRING HAIR LOSS: ICD-10-CM

## 2024-03-20 DIAGNOSIS — L21.9 SEBORRHEIC DERMATITIS, UNSPECIFIED: ICD-10-CM

## 2024-03-20 PROCEDURE — 99204 OFFICE O/P NEW MOD 45 MIN: CPT

## 2024-03-20 RX ORDER — MOMETASONE FUROATE 1 MG/ML
0.1 SOLUTION TOPICAL
Qty: 1 | Refills: 2 | Status: ACTIVE | COMMUNITY
Start: 2024-03-20 | End: 1900-01-01

## 2024-03-20 RX ORDER — KETOCONAZOLE 20.5 MG/ML
2 SHAMPOO, SUSPENSION TOPICAL
Qty: 1 | Refills: 11 | Status: ACTIVE | COMMUNITY
Start: 2024-03-20 | End: 1900-01-01

## 2024-03-20 NOTE — HISTORY OF PRESENT ILLNESS
[FreeTextEntry1] : hairloss; scaling [de-identified] : Ms. EMILY DUONG is a 48 year old F here for evaluation of below   #Hairloss, xyears >20 years, along hairline #Scaling on scalp, picks at scale. Washing every 3 days   Personal hx of skin cancer: no FHx of skin cancer: no Social Hx: not working

## 2024-03-20 NOTE — PHYSICAL EXAM
[Alert] : alert [Oriented x 3] : ~L oriented x 3 [Well Nourished] : well nourished [No Visual Lymphadenopathy] : no visual  lymphadenopathy [Conjunctiva Non-injected] : conjunctiva non-injected [No Edema] : no edema [No Clubbing] : no clubbing [No Bromhidrosis] : no bromhidrosis [No Chromhidrosis] : no chromhidrosis [Declined] : declined [FreeTextEntry3] : Focused exam only (see below) per patient request:  scarring band of alopecia on frontal, temporal, and occipital scalp with intermixed hairs greasy scaling on scalp

## 2024-03-20 NOTE — ASSESSMENT
[FreeTextEntry1] : Traction alopecia - chronic; progressing - Diagnosis, chronic nature, disease course, treatment options and goals of therapy discussed - Discussed areas of scarring may  be irreversible - Start topical 5% rogaine solution daily to BID to AA; SED. Proper use and expectations discussed  Seborrheic dermatitis, scalp - chronic; flaring - Diagnosis, chronic nature, disease course, treatment options and goals of therapy discussed - Start ketoconazole 2% shampoo EVERY OTHER DAY to affected areas on scalp.  - Start mometasone solution [id number] as needed for itch on scalp. SED; avoid use on face or neck

## 2024-03-29 ENCOUNTER — APPOINTMENT (OUTPATIENT)
Dept: MRI IMAGING | Facility: CLINIC | Age: 49
End: 2024-03-29
Payer: MEDICARE

## 2024-03-29 ENCOUNTER — RESULT REVIEW (OUTPATIENT)
Age: 49
End: 2024-03-29

## 2024-03-29 PROCEDURE — 70553 MRI BRAIN STEM W/O & W/DYE: CPT

## 2024-03-29 PROCEDURE — 72157 MRI CHEST SPINE W/O & W/DYE: CPT

## 2024-03-29 PROCEDURE — A9585: CPT | Mod: JW

## 2024-03-29 PROCEDURE — 72156 MRI NECK SPINE W/O & W/DYE: CPT

## 2024-03-29 PROCEDURE — 76377 3D RENDER W/INTRP POSTPROCES: CPT

## 2024-03-29 PROCEDURE — A9585: CPT

## 2024-07-10 ENCOUNTER — APPOINTMENT (OUTPATIENT)
Dept: SURGERY | Facility: CLINIC | Age: 49
End: 2024-07-10
Payer: MEDICARE

## 2024-07-10 VITALS
RESPIRATION RATE: 16 BRPM | DIASTOLIC BLOOD PRESSURE: 94 MMHG | BODY MASS INDEX: 25.2 KG/M2 | TEMPERATURE: 96.4 F | HEIGHT: 59 IN | OXYGEN SATURATION: 99 % | SYSTOLIC BLOOD PRESSURE: 142 MMHG | WEIGHT: 125 LBS | HEART RATE: 70 BPM

## 2024-07-10 DIAGNOSIS — K62.4 STENOSIS OF ANUS AND RECTUM: ICD-10-CM

## 2024-07-10 PROCEDURE — 46600 DIAGNOSTIC ANOSCOPY SPX: CPT

## 2024-07-10 PROCEDURE — 99204 OFFICE O/P NEW MOD 45 MIN: CPT | Mod: 25

## 2024-08-13 ENCOUNTER — APPOINTMENT (OUTPATIENT)
Dept: ULTRASOUND IMAGING | Facility: IMAGING CENTER | Age: 49
End: 2024-08-13
Payer: MEDICARE

## 2024-08-13 ENCOUNTER — APPOINTMENT (OUTPATIENT)
Dept: MAMMOGRAPHY | Facility: IMAGING CENTER | Age: 49
End: 2024-08-13
Payer: MEDICARE

## 2024-08-13 PROCEDURE — 77067 SCR MAMMO BI INCL CAD: CPT | Mod: 26

## 2024-08-13 PROCEDURE — 76641 ULTRASOUND BREAST COMPLETE: CPT | Mod: 26,50

## 2024-08-13 PROCEDURE — 77063 BREAST TOMOSYNTHESIS BI: CPT | Mod: 26

## 2024-10-04 DIAGNOSIS — R06.09 OTHER FORMS OF DYSPNEA: ICD-10-CM

## 2024-10-14 ENCOUNTER — APPOINTMENT (OUTPATIENT)
Dept: CARDIOLOGY | Facility: CLINIC | Age: 49
End: 2024-10-14
Payer: MEDICARE

## 2024-10-14 PROCEDURE — 93306 TTE W/DOPPLER COMPLETE: CPT

## 2024-10-21 ENCOUNTER — NON-APPOINTMENT (OUTPATIENT)
Age: 49
End: 2024-10-21

## 2024-10-21 ENCOUNTER — APPOINTMENT (OUTPATIENT)
Dept: CARDIOLOGY | Facility: CLINIC | Age: 49
End: 2024-10-21
Payer: MEDICARE

## 2024-10-21 VITALS
DIASTOLIC BLOOD PRESSURE: 97 MMHG | OXYGEN SATURATION: 100 % | SYSTOLIC BLOOD PRESSURE: 152 MMHG | RESPIRATION RATE: 16 BRPM | HEIGHT: 59 IN | WEIGHT: 127 LBS | BODY MASS INDEX: 25.6 KG/M2 | HEART RATE: 79 BPM | TEMPERATURE: 97.9 F

## 2024-10-21 DIAGNOSIS — F41.9 ANXIETY DISORDER, UNSPECIFIED: ICD-10-CM

## 2024-10-21 DIAGNOSIS — E78.2 MIXED HYPERLIPIDEMIA: ICD-10-CM

## 2024-10-21 DIAGNOSIS — G35 MULTIPLE SCLEROSIS: ICD-10-CM

## 2024-10-21 DIAGNOSIS — I49.3 VENTRICULAR PREMATURE DEPOLARIZATION: ICD-10-CM

## 2024-10-21 PROCEDURE — 99213 OFFICE O/P EST LOW 20 MIN: CPT

## 2024-10-21 PROCEDURE — G2211 COMPLEX E/M VISIT ADD ON: CPT

## 2024-10-21 RX ORDER — OMEGA-3/DHA/EPA/FISH OIL 1200 MG
1200 CAPSULE ORAL
Refills: 0 | Status: ACTIVE | COMMUNITY
Start: 2024-10-21

## 2024-10-21 RX ORDER — ASCORBIC ACID
100 CRYSTALS ORAL
Refills: 0 | Status: ACTIVE | COMMUNITY
Start: 2024-10-21

## 2024-12-13 ENCOUNTER — APPOINTMENT (OUTPATIENT)
Dept: DERMATOLOGY | Facility: CLINIC | Age: 49
End: 2024-12-13
Payer: MEDICARE

## 2024-12-13 ENCOUNTER — NON-APPOINTMENT (OUTPATIENT)
Age: 49
End: 2024-12-13

## 2024-12-13 VITALS — WEIGHT: 125 LBS | HEIGHT: 59 IN | BODY MASS INDEX: 25.2 KG/M2

## 2024-12-13 DIAGNOSIS — L65.8 OTHER SPECIFIED NONSCARRING HAIR LOSS: ICD-10-CM

## 2024-12-13 DIAGNOSIS — L21.9 SEBORRHEIC DERMATITIS, UNSPECIFIED: ICD-10-CM

## 2024-12-13 DIAGNOSIS — L30.9 DERMATITIS, UNSPECIFIED: ICD-10-CM

## 2024-12-13 PROCEDURE — 99214 OFFICE O/P EST MOD 30 MIN: CPT

## 2024-12-13 RX ORDER — HYDROCORTISONE 25 MG/G
2.5 CREAM TOPICAL
Qty: 1 | Refills: 1 | Status: ACTIVE | COMMUNITY
Start: 2024-12-13 | End: 1900-01-01

## 2024-12-16 ENCOUNTER — RESULT REVIEW (OUTPATIENT)
Age: 49
End: 2024-12-16

## 2024-12-16 ENCOUNTER — APPOINTMENT (OUTPATIENT)
Dept: MRI IMAGING | Facility: CLINIC | Age: 49
End: 2024-12-16
Payer: MEDICARE

## 2024-12-16 PROCEDURE — 72156 MRI NECK SPINE W/O & W/DYE: CPT

## 2024-12-16 PROCEDURE — A9585: CPT

## 2024-12-16 PROCEDURE — 0866T QUAN MRI ALYS BRN W/DX MRI: CPT

## 2024-12-16 PROCEDURE — 70553 MRI BRAIN STEM W/O & W/DYE: CPT

## 2024-12-16 PROCEDURE — A9585: CPT | Mod: JW

## 2024-12-16 PROCEDURE — 72157 MRI CHEST SPINE W/O & W/DYE: CPT

## 2025-03-22 ENCOUNTER — APPOINTMENT (OUTPATIENT)
Dept: MAMMOGRAPHY | Facility: IMAGING CENTER | Age: 50
End: 2025-03-22
Payer: MEDICARE

## 2025-03-22 ENCOUNTER — APPOINTMENT (OUTPATIENT)
Dept: ULTRASOUND IMAGING | Facility: IMAGING CENTER | Age: 50
End: 2025-03-22
Payer: MEDICARE

## 2025-03-22 ENCOUNTER — OUTPATIENT (OUTPATIENT)
Dept: OUTPATIENT SERVICES | Facility: HOSPITAL | Age: 50
LOS: 1 days | End: 2025-03-22
Payer: MEDICARE

## 2025-03-22 DIAGNOSIS — Z98.89 OTHER SPECIFIED POSTPROCEDURAL STATES: Chronic | ICD-10-CM

## 2025-03-22 DIAGNOSIS — O34.21 MATERNAL CARE FOR SCAR FROM PREVIOUS CESAREAN DELIVERY: Chronic | ICD-10-CM

## 2025-03-22 DIAGNOSIS — Z00.8 ENCOUNTER FOR OTHER GENERAL EXAMINATION: ICD-10-CM

## 2025-03-22 DIAGNOSIS — Z90.710 ACQUIRED ABSENCE OF BOTH CERVIX AND UTERUS: Chronic | ICD-10-CM

## 2025-03-22 DIAGNOSIS — N63 UNSPECIFIED LUMP IN BREAST: Chronic | ICD-10-CM

## 2025-03-22 PROCEDURE — 76642 ULTRASOUND BREAST LIMITED: CPT | Mod: 26,LT

## 2025-03-22 PROCEDURE — 76642 ULTRASOUND BREAST LIMITED: CPT

## 2025-04-01 NOTE — PATIENT PROFILE ADULT. - SURGICAL SITE INCISION
Detail Level: Zone Render In Strict Bullet Format?: No Initiate Treatment: Clobetasol scalp solution to scalp every night for 2 weeks no

## 2025-06-17 ENCOUNTER — NON-APPOINTMENT (OUTPATIENT)
Age: 50
End: 2025-06-17

## 2025-06-18 ENCOUNTER — APPOINTMENT (OUTPATIENT)
Dept: SURGERY | Facility: CLINIC | Age: 50
End: 2025-06-18

## 2025-06-18 VITALS — RESPIRATION RATE: 19 BRPM | TEMPERATURE: 97.2 F | HEART RATE: 73 BPM

## 2025-06-18 PROCEDURE — 99212 OFFICE O/P EST SF 10 MIN: CPT | Mod: 25

## 2025-06-18 PROCEDURE — 46600 DIAGNOSTIC ANOSCOPY SPX: CPT
